# Patient Record
Sex: FEMALE | Race: WHITE | Employment: PART TIME | URBAN - METROPOLITAN AREA
[De-identification: names, ages, dates, MRNs, and addresses within clinical notes are randomized per-mention and may not be internally consistent; named-entity substitution may affect disease eponyms.]

---

## 2021-04-16 ENCOUNTER — HOSPITAL ENCOUNTER (OUTPATIENT)
Dept: WOMENS IMAGING | Age: 51
Discharge: HOME | End: 2021-04-18
Payer: MEDICARE

## 2021-04-16 ENCOUNTER — HOSPITAL ENCOUNTER (OUTPATIENT)
Dept: ULTRASOUND IMAGING | Age: 51
Discharge: HOME | End: 2021-04-18
Payer: MEDICARE

## 2021-04-16 DIAGNOSIS — R92.8 ABNORMAL MAMMOGRAM: ICD-10-CM

## 2021-04-16 DIAGNOSIS — N63.20 LEFT BREAST MASS: ICD-10-CM

## 2021-04-16 PROCEDURE — 76642 ULTRASOUND BREAST LIMITED: CPT

## 2021-04-16 PROCEDURE — G0279 TOMOSYNTHESIS, MAMMO: HCPCS

## 2021-08-13 ENCOUNTER — HOSPITAL ENCOUNTER (EMERGENCY)
Age: 51
Discharge: HOME | End: 2021-08-13
Attending: EMERGENCY MEDICINE
Payer: MEDICARE

## 2021-08-13 ENCOUNTER — APPOINTMENT (OUTPATIENT)
Dept: CT IMAGING | Age: 51
End: 2021-08-13
Payer: MEDICARE

## 2021-08-13 VITALS
WEIGHT: 160 LBS | SYSTOLIC BLOOD PRESSURE: 119 MMHG | TEMPERATURE: 97.9 F | HEART RATE: 78 BPM | OXYGEN SATURATION: 98 % | DIASTOLIC BLOOD PRESSURE: 81 MMHG | RESPIRATION RATE: 16 BRPM | HEIGHT: 69 IN | BODY MASS INDEX: 23.7 KG/M2

## 2021-08-13 DIAGNOSIS — Z87.11 HX OF GASTRIC ULCER: ICD-10-CM

## 2021-08-13 DIAGNOSIS — R10.13 ABDOMINAL PAIN, EPIGASTRIC: Primary | ICD-10-CM

## 2021-08-13 DIAGNOSIS — N30.00 ACUTE CYSTITIS WITHOUT HEMATURIA: ICD-10-CM

## 2021-08-13 DIAGNOSIS — L30.9 DERMATITIS: ICD-10-CM

## 2021-08-13 LAB
ALBUMIN SERPL-MCNC: 3.5 G/DL (ref 3.5–4.6)
ALP BLD-CCNC: 93 U/L (ref 40–130)
ALT SERPL-CCNC: 17 U/L (ref 0–33)
ANION GAP SERPL CALCULATED.3IONS-SCNC: 9 MEQ/L (ref 9–15)
AST SERPL-CCNC: 26 U/L (ref 0–35)
BILIRUB SERPL-MCNC: <0.2 MG/DL (ref 0.2–0.7)
BILIRUBIN URINE: NEGATIVE
BLOOD, URINE: ABNORMAL
BUN BLDV-MCNC: 12 MG/DL (ref 6–20)
CALCIUM SERPL-MCNC: 8.5 MG/DL (ref 8.5–9.9)
CHLORIDE BLD-SCNC: 103 MEQ/L (ref 95–107)
CLARITY: CLEAR
CO2: 28 MEQ/L (ref 20–31)
COLOR: YELLOW
CREAT SERPL-MCNC: 0.56 MG/DL (ref 0.5–0.9)
GFR AFRICAN AMERICAN: >60
GFR NON-AFRICAN AMERICAN: >60
GLOBULIN: 2.6 G/DL (ref 2.3–3.5)
GLUCOSE BLD-MCNC: 78 MG/DL (ref 70–99)
GLUCOSE URINE: NEGATIVE MG/DL
HCT VFR BLD CALC: 40.1 % (ref 37–47)
HEMOGLOBIN: 13.6 G/DL (ref 12–16)
KETONES, URINE: NEGATIVE MG/DL
LEUKOCYTE ESTERASE, URINE: ABNORMAL
MCH RBC QN AUTO: 32.4 PG (ref 27–31.3)
MCHC RBC AUTO-ENTMCNC: 33.9 % (ref 33–37)
MCV RBC AUTO: 95.6 FL (ref 82–100)
NITRITE, URINE: NEGATIVE
PDW BLD-RTO: 14.4 % (ref 11.5–14.5)
PH UA: 6 (ref 5–9)
PLATELET # BLD: 433 K/UL (ref 130–400)
POTASSIUM SERPL-SCNC: 3.2 MEQ/L (ref 3.4–4.9)
PROTEIN UA: NEGATIVE MG/DL
RBC # BLD: 4.19 M/UL (ref 4.2–5.4)
RBC UA: ABNORMAL /HPF (ref 0–2)
SODIUM BLD-SCNC: 140 MEQ/L (ref 135–144)
SPECIFIC GRAVITY UA: 1.01 (ref 1–1.03)
TOTAL PROTEIN: 6.1 G/DL (ref 6.3–8)
URINE REFLEX TO CULTURE: YES
UROBILINOGEN, URINE: 1 E.U./DL
WBC # BLD: 7.5 K/UL (ref 4.8–10.8)
WBC UA: ABNORMAL /HPF (ref 0–5)

## 2021-08-13 PROCEDURE — 96375 TX/PRO/DX INJ NEW DRUG ADDON: CPT

## 2021-08-13 PROCEDURE — 99284 EMERGENCY DEPT VISIT MOD MDM: CPT

## 2021-08-13 PROCEDURE — 6360000002 HC RX W HCPCS: Performed by: NURSE PRACTITIONER

## 2021-08-13 PROCEDURE — 80053 COMPREHEN METABOLIC PANEL: CPT

## 2021-08-13 PROCEDURE — 87186 SC STD MICRODIL/AGAR DIL: CPT

## 2021-08-13 PROCEDURE — 2580000003 HC RX 258: Performed by: NURSE PRACTITIONER

## 2021-08-13 PROCEDURE — 74177 CT ABD & PELVIS W/CONTRAST: CPT

## 2021-08-13 PROCEDURE — 87086 URINE CULTURE/COLONY COUNT: CPT

## 2021-08-13 PROCEDURE — 36415 COLL VENOUS BLD VENIPUNCTURE: CPT

## 2021-08-13 PROCEDURE — 96374 THER/PROPH/DIAG INJ IV PUSH: CPT

## 2021-08-13 PROCEDURE — 87077 CULTURE AEROBIC IDENTIFY: CPT

## 2021-08-13 PROCEDURE — 85027 COMPLETE CBC AUTOMATED: CPT

## 2021-08-13 PROCEDURE — 6360000004 HC RX CONTRAST MEDICATION: Performed by: EMERGENCY MEDICINE

## 2021-08-13 PROCEDURE — 81001 URINALYSIS AUTO W/SCOPE: CPT

## 2021-08-13 RX ORDER — CLOTRIMAZOLE AND BETAMETHASONE DIPROPIONATE 10; .64 MG/G; MG/G
CREAM TOPICAL
Qty: 45 G | Refills: 1 | Status: SHIPPED | OUTPATIENT
Start: 2021-08-13

## 2021-08-13 RX ORDER — NITROFURANTOIN 25; 75 MG/1; MG/1
100 CAPSULE ORAL 2 TIMES DAILY
Qty: 14 CAPSULE | Refills: 0 | Status: SHIPPED | OUTPATIENT
Start: 2021-08-13 | End: 2021-08-20

## 2021-08-13 RX ORDER — ONDANSETRON 2 MG/ML
4 INJECTION INTRAMUSCULAR; INTRAVENOUS ONCE
Status: COMPLETED | OUTPATIENT
Start: 2021-08-13 | End: 2021-08-13

## 2021-08-13 RX ORDER — MORPHINE SULFATE 4 MG/ML
4 INJECTION, SOLUTION INTRAMUSCULAR; INTRAVENOUS
Status: DISCONTINUED | OUTPATIENT
Start: 2021-08-13 | End: 2021-08-13 | Stop reason: HOSPADM

## 2021-08-13 RX ORDER — 0.9 % SODIUM CHLORIDE 0.9 %
1000 INTRAVENOUS SOLUTION INTRAVENOUS ONCE
Status: COMPLETED | OUTPATIENT
Start: 2021-08-13 | End: 2021-08-13

## 2021-08-13 RX ADMIN — IOPAMIDOL 100 ML: 755 INJECTION, SOLUTION INTRAVENOUS at 19:20

## 2021-08-13 RX ADMIN — ONDANSETRON 4 MG: 2 INJECTION INTRAMUSCULAR; INTRAVENOUS at 18:51

## 2021-08-13 RX ADMIN — MORPHINE SULFATE 4 MG: 4 INJECTION, SOLUTION INTRAMUSCULAR; INTRAVENOUS at 18:51

## 2021-08-13 RX ADMIN — SODIUM CHLORIDE 1000 ML: 9 INJECTION, SOLUTION INTRAVENOUS at 18:51

## 2021-08-13 ASSESSMENT — ENCOUNTER SYMPTOMS
COUGH: 0
NAUSEA: 0
CONSTIPATION: 0
RHINORRHEA: 0
BACK PAIN: 0
VOMITING: 0
ABDOMINAL DISTENTION: 0
SINUS PRESSURE: 0
TROUBLE SWALLOWING: 0
SORE THROAT: 0
WHEEZING: 0
ABDOMINAL PAIN: 1
DIARRHEA: 0
CHEST TIGHTNESS: 0
SINUS PAIN: 0
COLOR CHANGE: 0
SHORTNESS OF BREATH: 0

## 2021-08-13 ASSESSMENT — PAIN DESCRIPTION - LOCATION: LOCATION: ABDOMEN;FLANK

## 2021-08-13 ASSESSMENT — PAIN SCALES - GENERAL
PAINLEVEL_OUTOF10: 7
PAINLEVEL_OUTOF10: 7

## 2021-08-13 NOTE — ED TRIAGE NOTES
Pt arrived with complaint of abdominal and flank pain. Pt reports the pain is located generalized abdominal and flank area. Pt reports the pain is intermittent. Pt reports the pain started yesterday. Pt reports her urine has an \"odor\" and her legs started to swell. Pt denies shortness of breath, weakness, fatigue, fever, cold sweats, dizziness, vomiting or nausea. Pt is A & O x 4.

## 2021-08-14 NOTE — ED PROVIDER NOTES
[facility] ED  EMERGENCY DEPARTMENT ENCOUNTER      Pt Name: Pastor Vallejo  MRN: [id number]  [place] 1970  Date of evaluation: 8/13/2021  Provider: Lety Martinez       Chief Complaint   Patient presents with    Abdominal Pain    Flank Pain         HISTORY OF PRESENT ILLNESS   (Location/Symptom, Timing/Onset,Context/Setting, Quality, Duration, Modifying Factors, Severity)  Note limiting factors. Pastor Vallejo is a 54 y.o. female who presents to the emergency department for complaint of upper abdominal pain and discomfort for the past 2 days. Patient states pain is up to a 7 out of 10. She states he does have pain medication at home and tried taking these but it did not resolve the pain. She states the pain did spontaneously decrease but has returned again today with a generalized abdominal discomfort and tenderness. She states that she is eating and drinking well denies any nausea vomiting diarrhea. States she has not had a bowel movement the past 2 days usually regular but denies constipation sensation at this time. She denies any fevers chills sweats or generalized illness symptoms. She does have a history of multiple surgeries of the abdomen including gastric bypass followed by ulcer repair and sepsis with peritonitis. She states that her abdomen is not distended and is soft has a generalized tenderness that the entire to the abdomen. She denies any chest pain shortness breath difficulty breathing. She additionally states that she has some mild swelling of her right ankle as well as a rash that is developed in her right lower leg uncertain of any contact with any allergens or irritants denies any injury to her leg. Nursing Notes were reviewed. REVIEW OF SYSTEMS    (2-9 systems for level 4, 10 or more for level 5)     Review of Systems   Constitutional: Negative for activity change, appetite change, chills, diaphoresis, fatigue and fever. HENT: Negative for congestion, ear pain, postnasal drip, rhinorrhea, sinus pressure, sinus pain, sore throat and trouble swallowing. Eyes: Negative for visual disturbance. Respiratory: Negative for cough, chest tightness, shortness of breath and wheezing. Cardiovascular: Negative for chest pain and palpitations. Gastrointestinal: Positive for abdominal pain. Negative for abdominal distention, constipation, diarrhea, nausea and vomiting. Genitourinary: Negative for difficulty urinating, dysuria, flank pain, frequency and urgency. Musculoskeletal: Negative for arthralgias, back pain, myalgias, neck pain and neck stiffness. Skin: Positive for rash. Negative for color change. Neurological: Negative for dizziness, tremors, seizures, syncope, speech difficulty, weakness, light-headedness, numbness and headaches. Except as noted above the remainder of the review of systems was reviewed and negative.        PAST MEDICAL HISTORY     Past Medical History:   Diagnosis Date    Fibromyalgia      Past Surgical History:   Procedure Laterality Date    GASTRIC BYPASS SURGERY      2008     Social History     Socioeconomic History    Marital status: Single     Spouse name: None    Number of children: None    Years of education: None    Highest education level: None   Occupational History    None   Tobacco Use    Smoking status: Current Every Day Smoker     Packs/day: 1.00     Years: 25.00     Pack years: 25.00    Smokeless tobacco: Never Used   Substance and Sexual Activity    Alcohol use: Yes     Comment: 1-2 drinks    Drug use: Never    Sexual activity: None   Other Topics Concern    None   Social History Narrative    None     Social Determinants of Health     Financial Resource Strain:     Difficulty of Paying Living Expenses:    Food Insecurity:     Worried About Running Out of Food in the Last Year:     Ran Out of Food in the Last Year:    Transportation Needs:     Lack of Transportation Abdominal:      General: Bowel sounds are normal. There is no distension. Palpations: Abdomen is soft. There is no mass. Tenderness: There is generalized abdominal tenderness. There is no guarding or rebound. Hernia: No hernia is present. Musculoskeletal:         General: No tenderness or signs of injury. Normal range of motion. Cervical back: Normal range of motion and neck supple. No rigidity or tenderness. Right knee: Normal.      Right lower leg: Swelling present. No tenderness. Right ankle: Swelling present. No deformity, ecchymosis or lacerations. No tenderness. Normal range of motion. Legs:    Skin:     General: Skin is warm and dry. Capillary Refill: Capillary refill takes less than 2 seconds. Findings: Rash present. Neurological:      General: No focal deficit present. Mental Status: She is alert and oriented to person, place, and time. Mental status is at baseline. Cranial Nerves: No cranial nerve deficit. Sensory: No sensory deficit. Motor: No weakness. Coordination: Coordination normal.         RESULTS     EKG: All EKG's are interpreted by the Emergency Department Physician who either signs or Co-signsthis chart in the absence of a cardiologist.        RADIOLOGY:   [place] such as CT, Ultrasound and MRI are read by the radiologist. Dmitriy Ungerman radiographic images are visualized and preliminarily interpreted by the emergency physician with the below findings:    IMPRESSION:     Mild wall thickening of the stomach may be due to underdistention or gastritis.     Small hiatal hernia.     Interpretation per the Radiologist below, if available at the time ofthis note:    CT ABDOMEN PELVIS W IV CONTRAST Additional Contrast? None   Final Result            ED BEDSIDE ULTRASOUND:   Performed by ED Physician - none    LABS:  Labs Reviewed   URINE RT REFLEX TO CULTURE - Abnormal; Notable for the following components:       Result feels more comfortable and stable for discharge. States she has at work in the morning and she is feeling better at this time. There is a small rash on the right lower extremity with nonpitting swelling of the ankle without signs of injury. Patient will be provided with medication for dermatitis antifungal medication. Unknown etiology at this time. There are no other apparent trauma to the lower leg or ankle. Try to follow primary care provider, states that she is from [place] and has a GI specialist primary care provider there but she is moving to this area I will provide her with a GI specialist follow-up contact formation. Return to the ER if any onset of new concerns and worsening condition. Patient verbalized understandable given instruction education. CRITICAL CARE TIME       CONSULTS:  None    PROCEDURES:  Unless otherwise noted below, none     Procedures    FINAL IMPRESSION      1. Abdominal pain, epigastric    2. Hx of gastric ulcer    3. Acute cystitis without hematuria    4. Dermatitis          DISPOSITION/PLAN   DISPOSITION Decision To Discharge 08/13/2021 08:38:48 PM      PATIENT REFERRED TO:  Anshul Beckett MD  [address] [place] [id number] [phone]    Call in 1 day      Kalpesh Garcia  [address] [place] [address] [phone]            DISCHARGE MEDICATIONS:  Discharge Medication List as of 8/13/2021  8:57 PM      START taking these medications    Details   nitrofurantoin, macrocrystal-monohydrate, (MACROBID) 100 MG capsule Take 1 capsule by mouth 2 times daily for 7 days, Disp-14 capsule, R-0Print      clotrimazole-betamethasone (LOTRISONE) 1-0.05 % cream Apply topically 2 times daily. , Disp-45 g, R-1, Print                (Please notethat portions of this note were completed with a voice recognition program.  Efforts were made to edit the dictations but occasionally words are mis-transcribed.)    NAVIN Rdz - CNP (electronically signed)  Attending Emergency Physician         Anita Dean, NAVIN - MICHELLE  08/13/21 1054

## 2021-08-15 LAB
GFR AFRICAN AMERICAN: >60
GFR NON-AFRICAN AMERICAN: >60
PERFORMED ON: NORMAL
POC CREATININE: 0.6 MG/DL (ref 0.6–1.1)
POC SAMPLE TYPE: NORMAL

## 2021-08-16 LAB
ORGANISM: ABNORMAL
URINE CULTURE, ROUTINE: ABNORMAL

## 2022-07-30 ENCOUNTER — HOSPITAL ENCOUNTER (EMERGENCY)
Age: 52
Discharge: HOME | End: 2022-07-30
Payer: COMMERCIAL

## 2022-07-30 VITALS
BODY MASS INDEX: 21.98 KG/M2 | RESPIRATION RATE: 17 BRPM | TEMPERATURE: 99.3 F | WEIGHT: 145 LBS | HEART RATE: 76 BPM | HEIGHT: 68 IN | DIASTOLIC BLOOD PRESSURE: 76 MMHG | OXYGEN SATURATION: 96 % | SYSTOLIC BLOOD PRESSURE: 121 MMHG

## 2022-07-30 DIAGNOSIS — S61.216A LACERATION OF RIGHT LITTLE FINGER WITHOUT FOREIGN BODY WITHOUT DAMAGE TO NAIL, INITIAL ENCOUNTER: Primary | ICD-10-CM

## 2022-07-30 PROCEDURE — 90471 IMMUNIZATION ADMIN: CPT | Performed by: PHYSICIAN ASSISTANT

## 2022-07-30 PROCEDURE — 6370000000 HC RX 637 (ALT 250 FOR IP): Performed by: PHYSICIAN ASSISTANT

## 2022-07-30 PROCEDURE — 6360000002 HC RX W HCPCS: Performed by: PHYSICIAN ASSISTANT

## 2022-07-30 PROCEDURE — 90715 TDAP VACCINE 7 YRS/> IM: CPT | Performed by: PHYSICIAN ASSISTANT

## 2022-07-30 PROCEDURE — 99284 EMERGENCY DEPT VISIT MOD MDM: CPT

## 2022-07-30 RX ORDER — OXYCODONE HYDROCHLORIDE AND ACETAMINOPHEN 5; 325 MG/1; MG/1
1 TABLET ORAL EVERY 8 HOURS PRN
Qty: 6 TABLET | Refills: 0 | Status: SHIPPED | OUTPATIENT
Start: 2022-07-30 | End: 2022-08-01

## 2022-07-30 RX ORDER — OXYCODONE HYDROCHLORIDE AND ACETAMINOPHEN 5; 325 MG/1; MG/1
1 TABLET ORAL EVERY 8 HOURS PRN
Qty: 6 TABLET | Refills: 0 | Status: SHIPPED | OUTPATIENT
Start: 2022-07-30 | End: 2022-07-30

## 2022-07-30 RX ORDER — OXYCODONE HYDROCHLORIDE AND ACETAMINOPHEN 5; 325 MG/1; MG/1
1 TABLET ORAL ONCE
Status: COMPLETED | OUTPATIENT
Start: 2022-07-30 | End: 2022-07-30

## 2022-07-30 RX ORDER — SULFAMETHOXAZOLE AND TRIMETHOPRIM 800; 160 MG/1; MG/1
1 TABLET ORAL ONCE
Status: COMPLETED | OUTPATIENT
Start: 2022-07-30 | End: 2022-07-30

## 2022-07-30 RX ADMIN — SULFAMETHOXAZOLE AND TRIMETHOPRIM 1 TABLET: 800; 160 TABLET ORAL at 19:39

## 2022-07-30 RX ADMIN — TETANUS TOXOID, REDUCED DIPHTHERIA TOXOID AND ACELLULAR PERTUSSIS VACCINE, ADSORBED 0.5 ML: 5; 2.5; 8; 8; 2.5 SUSPENSION INTRAMUSCULAR at 19:39

## 2022-07-30 RX ADMIN — OXYCODONE AND ACETAMINOPHEN 1 TABLET: 5; 325 TABLET ORAL at 19:39

## 2022-07-30 ASSESSMENT — ENCOUNTER SYMPTOMS
ALLERGIC/IMMUNOLOGIC NEGATIVE: 1
TROUBLE SWALLOWING: 0
APNEA: 0
SHORTNESS OF BREATH: 0
EYE PAIN: 0
ABDOMINAL PAIN: 0
COLOR CHANGE: 0

## 2022-07-30 ASSESSMENT — PAIN DESCRIPTION - LOCATION
LOCATION: HAND
LOCATION: FINGER (COMMENT WHICH ONE)

## 2022-07-30 ASSESSMENT — PAIN DESCRIPTION - DESCRIPTORS: DESCRIPTORS: ACHING

## 2022-07-30 ASSESSMENT — LIFESTYLE VARIABLES
HOW OFTEN DO YOU HAVE A DRINK CONTAINING ALCOHOL: NEVER
HOW MANY STANDARD DRINKS CONTAINING ALCOHOL DO YOU HAVE ON A TYPICAL DAY: PATIENT DOES NOT DRINK

## 2022-07-30 ASSESSMENT — PAIN DESCRIPTION - ORIENTATION: ORIENTATION: RIGHT

## 2022-07-30 ASSESSMENT — PAIN DESCRIPTION - ONSET: ONSET: SUDDEN

## 2022-07-30 ASSESSMENT — PAIN DESCRIPTION - FREQUENCY: FREQUENCY: CONTINUOUS

## 2022-07-30 ASSESSMENT — PAIN - FUNCTIONAL ASSESSMENT
PAIN_FUNCTIONAL_ASSESSMENT: ACTIVITIES ARE NOT PREVENTED
PAIN_FUNCTIONAL_ASSESSMENT: 0-10

## 2022-07-30 ASSESSMENT — PAIN SCALES - GENERAL: PAINLEVEL_OUTOF10: 10

## 2022-07-30 ASSESSMENT — PAIN DESCRIPTION - PAIN TYPE: TYPE: ACUTE PAIN

## 2022-07-30 NOTE — ED TRIAGE NOTES
Pt was at home cooking when she accidentally cut the tip of her #5 finger on her right hand. Pt states there is a lot of bleeding but she has it well wrapped. Pt states she believes the cut is just before the nail and not into it.

## 2022-07-31 NOTE — ED PROVIDER NOTES
[facility] ED  eMERGENCYdEPARTMENT eNCOUnter      Pt Name: Gloria Coffey  MRN: [id number]  [place] 1970  Date of evaluation: 7/30/2022  Provider:Bijan Braswell PA-C    CHIEF COMPLAINT       Chief Complaint   Patient presents with    Laceration         HISTORY OF PRESENT ILLNESS  (Location/Symptom, Timing/Onset, Context/Setting, Quality, Duration, Modifying Factors, Severity.)   Gloria Coffey is a 50 y.o. female who presents to the emergency department with an avulsion laceration to the tip of the right fifth finger. Patient states that she was using a mandolin when the injury occurred. This happened 2 hours prior to arrival.  Patient states that she is not able to get the bleeding to stop so that is why she came in. Last tetanus shot approximately 8 years ago. HPI    Nursing Notes were reviewed and I agree. REVIEW OF SYSTEMS    (2-9 systems for level 4, 10 or more for level 5)     Review of Systems   Constitutional:  Negative for diaphoresis and fever. HENT:  Negative for hearing loss and trouble swallowing. Eyes:  Negative for pain. Respiratory:  Negative for apnea and shortness of breath. Cardiovascular:  Negative for chest pain. Gastrointestinal:  Negative for abdominal pain. Endocrine: Negative. Genitourinary:  Negative for hematuria. Musculoskeletal:  Negative for neck pain and neck stiffness. Skin:  Positive for wound. Negative for color change. Allergic/Immunologic: Negative. Neurological:  Negative for dizziness and numbness. Hematological: Negative. Psychiatric/Behavioral: Negative. All other systems reviewed and are negative. Except as noted above the remainder of the review of systems was reviewed and negative.        PAST MEDICAL HISTORY     Past Medical History:   Diagnosis Date    Fibromyalgia          SURGICAL HISTORY       Past Surgical History:   Procedure Laterality Date    GASTRIC BYPASS SURGERY      2008         CURRENT MEDICATIONS Previous Medications    CLOTRIMAZOLE-BETAMETHASONE (LOTRISONE) 1-0.05 % CREAM    Apply topically 2 times daily. ALLERGIES     Pcn [penicillins]    FAMILY HISTORY     History reviewed. No pertinent family history. SOCIAL HISTORY       Social History     Socioeconomic History    Marital status: Single     Spouse name: None    Number of children: None    Years of education: None    Highest education level: None   Tobacco Use    Smoking status: Every Day     Packs/day: 1.00     Years: 25.00     Pack years: 25.00     Types: Cigarettes    Smokeless tobacco: Never   Substance and Sexual Activity    Alcohol use: Yes     Comment: 1-2 drinks    Drug use: Never       SCREENINGS    Marisa Coma Scale  Eye Opening: Spontaneous  Best Verbal Response: Oriented  Best Motor Response: Obeys commands  [place] Coma Scale Score: 15      PHYSICAL EXAM    (up to 7 forlevel 4, 8 or more for level 5)     ED Triage Vitals [07/30/22 1908]   BP Temp Temp Source Heart Rate Resp SpO2 Height Weight   121/76 99.3 °F (37.4 °C) Oral 76 17 96 % 5' 8\" (1.727 m) 145 lb (65.8 kg)       Physical Exam  Vitals and nursing note reviewed. Constitutional:       General: She is not in acute distress. Appearance: She is well-developed. HENT:      Head: Normocephalic and atraumatic. Nose: Nose normal.      Mouth/Throat:      Mouth: Mucous membranes are moist.   Eyes:      General: No scleral icterus. Pupils: Pupils are equal, round, and reactive to light. Neck:      Trachea: No tracheal deviation. Cardiovascular:      Rate and Rhythm: Normal rate and regular rhythm. Heart sounds: Normal heart sounds. No murmur heard. Pulmonary:      Effort: Pulmonary effort is normal. No respiratory distress. Breath sounds: Normal breath sounds. No wheezing or rales. Abdominal:      General: Bowel sounds are normal. There is no distension. Palpations: Abdomen is soft. Tenderness: There is no abdominal tenderness.  There is no guarding. Musculoskeletal:         General: Normal range of motion. Right hand: Laceration present. Hands:       Cervical back: Normal range of motion and neck supple. Skin:     General: Skin is warm and dry. Findings: No erythema or rash. Neurological:      Mental Status: She is alert and oriented to person, place, and time. Deep Tendon Reflexes: Reflexes are normal and symmetric. Psychiatric:         Behavior: Behavior normal.         Thought Content: Thought content normal.         Judgment: Judgment normal.         DIAGNOSTIC RESULTS     RADIOLOGY:   Non-plain film images such as CT, Ultrasound and MRI are read by the radiologist. Plain radiographic images are visualized and preliminarilyinterpreted by Akira Cochran PA-C with the below findings:        Interpretation per the Radiologist below, if available at the time of this note:    No orders to display       LABS:  Labs Reviewed - No data to display    All other labs were within normal range or not returnedas of this dictation. EMERGENCYDEPARTMENT COURSE and DIFFERENTIAL DIAGNOSIS/MDM:   Vitals:    Vitals:    07/30/22 1908   BP: 121/76   Pulse: 76   Resp: 17   Temp: 99.3 °F (37.4 °C)   TempSrc: Oral   SpO2: 96%   Weight: 145 lb (65.8 kg)   Height: 5' 8\" (1.727 m)       REASSESSMENT        Patient presented with a fingertip avulsion laceration. This was closed with skin glue with good result. Splint for protection. Patient will be discharged with pain control.   Patient is already on oral antibiotics and I will advise her to continue taking these antibiotics    MDM      PROCEDURES:    Lac Repair    Date/Time: 7/30/2022 8:03 PM  Performed by: Akira Cochran PA-C  Authorized by: Akira Cochran PA-C     Consent:     Consent obtained:  Verbal    Consent given by:  Patient    Risks, benefits, and alternatives were discussed: yes      Risks discussed:  Infection, pain, poor cosmetic result and need for additional repair  Universal protocol:     Procedure explained and questions answered to patient or proxy's satisfaction: yes      Relevant documents present and verified: yes      Immediately prior to procedure, a time out was called: yes      Patient identity confirmed:  Verbally with patient and arm band  Anesthesia:     Anesthesia method:  None  Laceration details:     Location:  Finger    Finger location:  R small finger    Length (cm):  0.5    Depth (mm):  1  Pre-procedure details:     Preparation:  Patient was prepped and draped in usual sterile fashion  Exploration:     Limited defect created (wound extended): no      Hemostasis achieved with:  Tourniquet    Wound exploration: wound explored through full range of motion      Contaminated: no    Treatment:     Area cleansed with:  Chlorhexidine    Amount of cleaning:  Standard    Irrigation solution:  Sterile saline  Skin repair:     Repair method:  Tissue adhesive  Repair type:     Repair type:  Simple  Post-procedure details:     Dressing:  Splint for protection    Procedure completion:  Tolerated well, no immediate complications      FINAL IMPRESSION      1. Laceration of right little finger without foreign body without damage to nail, initial encounter          DISPOSITION/PLAN   DISPOSITION Decision To Discharge 07/30/2022 08:04:59 PM      PATIENT REFERRED TO:  Heather German MD  [address] [place] [id number] [phone]    Call in 2 days      DISCHARGE MEDICATIONS:  New Prescriptions    OXYCODONE-ACETAMINOPHEN (PERCOCET) 5-325 MG PER TABLET    Take 1 tablet by mouth every 8 hours as needed for Pain for up to 2 days. Intended supply: 2 days.  Take lowest dose possible to manage pain       (Please note that portions of this note were completed with a voice recognition program.  Efforts were made to edit the dictations but occasionally words are mis-transcribed.)    CATHY Scott PA-C  07/30/22 2006

## 2023-01-13 ENCOUNTER — HOSPITAL ENCOUNTER (OUTPATIENT)
Dept: CT IMAGING | Age: 53
End: 2023-01-13
Payer: COMMERCIAL

## 2023-01-13 DIAGNOSIS — S00.93XA CONTUSION OF UNSPECIFIED PART OF HEAD, INITIAL ENCOUNTER: ICD-10-CM

## 2023-01-13 PROCEDURE — 70450 CT HEAD/BRAIN W/O DYE: CPT

## 2023-02-12 ENCOUNTER — HOSPITAL ENCOUNTER (INPATIENT)
Age: 53
LOS: 1 days | DRG: 308 | End: 2023-02-13
Attending: EMERGENCY MEDICINE | Admitting: INTERNAL MEDICINE
Payer: COMMERCIAL

## 2023-02-12 ENCOUNTER — APPOINTMENT (OUTPATIENT)
Dept: GENERAL RADIOLOGY | Age: 53
DRG: 308 | End: 2023-02-12
Payer: COMMERCIAL

## 2023-02-12 ENCOUNTER — APPOINTMENT (OUTPATIENT)
Dept: CT IMAGING | Age: 53
DRG: 308 | End: 2023-02-12
Payer: COMMERCIAL

## 2023-02-12 DIAGNOSIS — I46.9 CARDIOPULMONARY ARREST (HCC): Primary | ICD-10-CM

## 2023-02-12 DIAGNOSIS — J69.0 ASPIRATION PNEUMONIA OF LEFT LOWER LOBE DUE TO GASTRIC SECRETIONS (HCC): ICD-10-CM

## 2023-02-12 DIAGNOSIS — I47.20 VENTRICULAR TACHYCARDIA: ICD-10-CM

## 2023-02-12 DIAGNOSIS — J18.9 PNEUMONIA OF LEFT LOWER LOBE DUE TO INFECTIOUS ORGANISM: ICD-10-CM

## 2023-02-12 LAB
ABO/RH: NORMAL
ALBUMIN SERPL-MCNC: 2.4 G/DL (ref 3.5–4.6)
ALP BLD-CCNC: 90 U/L (ref 40–130)
ALT SERPL-CCNC: 40 U/L (ref 0–33)
AMPHETAMINE SCREEN, URINE: NORMAL
ANION GAP SERPL CALCULATED.3IONS-SCNC: 11 MEQ/L (ref 9–15)
ANION GAP SERPL CALCULATED.3IONS-SCNC: 15 MEQ/L (ref 9–15)
ANION GAP SERPL CALCULATED.3IONS-SCNC: 18 MEQ/L (ref 9–15)
ANION GAP SERPL CALCULATED.3IONS-SCNC: 18 MEQ/L (ref 9–15)
ANION GAP SERPL CALCULATED.3IONS-SCNC: 9 MEQ/L (ref 9–15)
ANTIBODY SCREEN: NORMAL
APTT: 37.1 SEC (ref 24.4–36.8)
APTT: 46.5 SEC (ref 24.4–36.8)
AST SERPL-CCNC: 126 U/L (ref 0–35)
ATYPICAL LYMPHOCYTE RELATIVE PERCENT: 1 %
BANDED NEUTROPHILS RELATIVE PERCENT: 6 % (ref 5–11)
BARBITURATE SCREEN URINE: NORMAL
BASE EXCESS ARTERIAL: -3 (ref -3–3)
BASE EXCESS ARTERIAL: -5 (ref -3–3)
BASE EXCESS ARTERIAL: -6 (ref -3–3)
BASE EXCESS ARTERIAL: -7 (ref -3–3)
BASE EXCESS ARTERIAL: -9 (ref -3–3)
BASE EXCESS VENOUS: -25 (ref -3–3)
BASOPHILS ABSOLUTE: 0.2 K/UL (ref 0–0.2)
BASOPHILS RELATIVE PERCENT: 1 %
BENZODIAZEPINE SCREEN, URINE: NORMAL
BILIRUB SERPL-MCNC: <0.2 MG/DL (ref 0.2–0.7)
BUN BLDV-MCNC: 12 MG/DL (ref 6–20)
BUN BLDV-MCNC: 19 MG/DL (ref 6–20)
BUN BLDV-MCNC: 24 MG/DL (ref 6–20)
BUN BLDV-MCNC: 26 MG/DL (ref 6–20)
BUN BLDV-MCNC: 28 MG/DL (ref 6–20)
CALCIUM IONIZED: 1.03 MMOL/L (ref 1.12–1.32)
CALCIUM IONIZED: 1.18 MMOL/L (ref 1.12–1.32)
CALCIUM IONIZED: 1.2 MMOL/L (ref 1.12–1.32)
CALCIUM IONIZED: 1.22 MMOL/L (ref 1.12–1.32)
CALCIUM IONIZED: 1.24 MMOL/L (ref 1.12–1.32)
CALCIUM IONIZED: 1.26 MMOL/L (ref 1.12–1.32)
CALCIUM IONIZED: 1.35 MMOL/L (ref 1.12–1.32)
CALCIUM SERPL-MCNC: 12 MG/DL (ref 8.5–9.9)
CALCIUM SERPL-MCNC: 6.7 MG/DL (ref 8.5–9.9)
CALCIUM SERPL-MCNC: 7 MG/DL (ref 8.5–9.9)
CALCIUM SERPL-MCNC: 9.2 MG/DL (ref 8.5–9.9)
CALCIUM SERPL-MCNC: 9.4 MG/DL (ref 8.5–9.9)
CANNABINOID SCREEN URINE: NORMAL
CHLORIDE BLD-SCNC: 112 MEQ/L (ref 95–107)
CHLORIDE BLD-SCNC: 114 MEQ/L (ref 95–107)
CHLORIDE BLD-SCNC: 114 MEQ/L (ref 95–107)
CHLORIDE BLD-SCNC: 115 MEQ/L (ref 95–107)
CHLORIDE BLD-SCNC: 117 MEQ/L (ref 95–107)
CHP ED QC CHECK: NORMAL
CO2: 20 MEQ/L (ref 20–31)
CO2: 23 MEQ/L (ref 20–31)
CO2: 23 MEQ/L (ref 20–31)
CO2: 28 MEQ/L (ref 20–31)
CO2: 29 MEQ/L (ref 20–31)
COCAINE METABOLITE SCREEN URINE: NORMAL
CREAT SERPL-MCNC: 0.8 MG/DL (ref 0.5–0.9)
CREAT SERPL-MCNC: 1.05 MG/DL (ref 0.5–0.9)
CREAT SERPL-MCNC: 1.27 MG/DL (ref 0.5–0.9)
CREAT SERPL-MCNC: 1.31 MG/DL (ref 0.5–0.9)
CREAT SERPL-MCNC: 1.35 MG/DL (ref 0.5–0.9)
EOSINOPHILS ABSOLUTE: 0.2 K/UL (ref 0–0.7)
EOSINOPHILS RELATIVE PERCENT: 1 %
ETHANOL PERCENT: NORMAL G/DL
ETHANOL: <10 MG/DL (ref 0–0.08)
FENTANYL SCREEN, URINE: NORMAL
GFR SERPL CREATININE-BSD FRML MDRD: 39 ML/MIN/{1.73_M2}
GFR SERPL CREATININE-BSD FRML MDRD: 42 ML/MIN/{1.73_M2}
GFR SERPL CREATININE-BSD FRML MDRD: 46 ML/MIN/{1.73_M2}
GFR SERPL CREATININE-BSD FRML MDRD: 47.6 ML/MIN/{1.73_M2}
GFR SERPL CREATININE-BSD FRML MDRD: 49.4 ML/MIN/{1.73_M2}
GFR SERPL CREATININE-BSD FRML MDRD: 50 ML/MIN/{1.73_M2}
GFR SERPL CREATININE-BSD FRML MDRD: 51.2 ML/MIN/{1.73_M2}
GFR SERPL CREATININE-BSD FRML MDRD: 55 ML/MIN/{1.73_M2}
GFR SERPL CREATININE-BSD FRML MDRD: >60 ML/MIN/{1.73_M2}
GLOBULIN: 1.5 G/DL (ref 2.3–3.5)
GLUCOSE BLD-MCNC: 110 MG/DL (ref 70–99)
GLUCOSE BLD-MCNC: 111 MG/DL (ref 70–99)
GLUCOSE BLD-MCNC: 118 MG/DL (ref 70–99)
GLUCOSE BLD-MCNC: 147 MG/DL (ref 70–99)
GLUCOSE BLD-MCNC: 169 MG/DL (ref 70–99)
GLUCOSE BLD-MCNC: 189 MG/DL (ref 70–99)
GLUCOSE BLD-MCNC: 297 MG/DL (ref 70–99)
GLUCOSE BLD-MCNC: 435 MG/DL
GLUCOSE BLD-MCNC: 435 MG/DL (ref 70–99)
GLUCOSE BLD-MCNC: 44 MG/DL (ref 70–99)
GLUCOSE BLD-MCNC: 63 MG/DL (ref 70–99)
GLUCOSE BLD-MCNC: 64 MG/DL (ref 70–99)
GLUCOSE BLD-MCNC: 66 MG/DL (ref 70–99)
GLUCOSE BLD-MCNC: 74 MG/DL (ref 70–99)
GLUCOSE BLD-MCNC: 83 MG/DL (ref 70–99)
HCO3 ARTERIAL: 21.2 MMOL/L (ref 21–29)
HCO3 ARTERIAL: 23.4 MMOL/L (ref 21–29)
HCO3 ARTERIAL: 24.7 MMOL/L (ref 21–29)
HCO3 ARTERIAL: 26.1 MMOL/L (ref 21–29)
HCO3 ARTERIAL: 27.1 MMOL/L (ref 21–29)
HCO3 VENOUS: 9.6 MMOL/L (ref 23–29)
HCT VFR BLD CALC: 41.6 % (ref 37–47)
HEMOGLOBIN: 13.2 G/DL (ref 12–16)
HEMOGLOBIN: 15.8 GM/DL (ref 12–16)
HEMOGLOBIN: 17.4 GM/DL (ref 12–16)
HEMOGLOBIN: 17.6 GM/DL (ref 12–16)
HEMOGLOBIN: 18 GM/DL (ref 12–16)
HEMOGLOBIN: 18.3 GM/DL (ref 12–16)
HEMOGLOBIN: 18.8 GM/DL (ref 12–16)
HEMOGLOBIN: 19 GM/DL (ref 12–16)
INR BLD: 1.3
INR BLD: 1.6
LACTATE: 12.29 MMOL/L (ref 0.4–2)
LACTATE: 2.13 MMOL/L (ref 0.4–2)
LACTATE: 2.36 MMOL/L (ref 0.4–2)
LACTATE: 3.6 MMOL/L (ref 0.4–2)
LACTATE: 3.72 MMOL/L (ref 0.4–2)
LACTATE: 5.21 MMOL/L (ref 0.4–2)
LACTATE: 6.04 MMOL/L (ref 0.4–2)
LACTIC ACID: 3.4 MMOL/L (ref 0.5–2.2)
LACTIC ACID: 4.6 MMOL/L (ref 0.5–2.2)
LIPASE: 116 U/L (ref 12–95)
LYMPHOCYTES ABSOLUTE: 9.6 K/UL (ref 1–4.8)
LYMPHOCYTES RELATIVE PERCENT: 39 %
Lab: NORMAL
MAGNESIUM: 2.1 MG/DL (ref 1.7–2.4)
MAGNESIUM: 2.9 MG/DL (ref 1.7–2.4)
MCH RBC QN AUTO: 33.4 PG (ref 27–31.3)
MCHC RBC AUTO-ENTMCNC: 31.8 % (ref 33–37)
MCV RBC AUTO: 105 FL (ref 79.4–94.8)
METAMYELOCYTES RELATIVE PERCENT: 2 %
METHADONE SCREEN, URINE: NORMAL
MONOCYTES ABSOLUTE: 0.2 K/UL (ref 0.2–0.8)
MONOCYTES RELATIVE PERCENT: 0.9 %
MYELOCYTE PERCENT: 1 %
NEUTROPHILS ABSOLUTE: 13.9 K/UL (ref 1.4–6.5)
NEUTROPHILS RELATIVE PERCENT: 49 %
O2 SAT, ARTERIAL: 62 % (ref 93–100)
O2 SAT, ARTERIAL: 62 % (ref 93–100)
O2 SAT, ARTERIAL: 68 % (ref 93–100)
O2 SAT, ARTERIAL: 71 % (ref 93–100)
O2 SAT, ARTERIAL: 78 % (ref 93–100)
O2 SAT, VEN: 83 %
OPIATE SCREEN URINE: NORMAL
OXYCODONE URINE: NORMAL
PCO2 ARTERIAL: 104 MM HG (ref 35–45)
PCO2 ARTERIAL: 112 MM HG (ref 35–45)
PCO2 ARTERIAL: 75 MM HG (ref 35–45)
PCO2 ARTERIAL: 75 MM HG (ref 35–45)
PCO2 ARTERIAL: 94 MM HG (ref 35–45)
PCO2 ARTERIAL: >161 MM HG (ref 35–45)
PCO2, VEN: 58.5 MM HG (ref 40–50)
PDW BLD-RTO: 13.2 % (ref 11.5–14.5)
PERFORMED ON: ABNORMAL
PERFORMED ON: NORMAL
PH ARTERIAL: 6.71 (ref 7.35–7.45)
PH ARTERIAL: 6.95 (ref 7.35–7.45)
PH ARTERIAL: 7.01 (ref 7.35–7.45)
PH ARTERIAL: 7.06 (ref 7.35–7.45)
PH ARTERIAL: 7.07 (ref 7.35–7.45)
PH ARTERIAL: 7.11 (ref 7.35–7.45)
PH VENOUS: 6.82 (ref 7.32–7.42)
PHENCYCLIDINE SCREEN URINE: NORMAL
PHOSPHORUS: 11.1 MG/DL (ref 2.3–4.8)
PHOSPHORUS: 11.8 MG/DL (ref 2.3–4.8)
PHOSPHORUS: 11.8 MG/DL (ref 2.3–4.8)
PLATELET # BLD: 289 K/UL (ref 130–400)
PLATELET SLIDE REVIEW: NORMAL
PO2 ARTERIAL: 46 MM HG (ref 75–108)
PO2 ARTERIAL: 49 MM HG (ref 75–108)
PO2 ARTERIAL: 53 MM HG (ref 75–108)
PO2 ARTERIAL: 54 MM HG (ref 75–108)
PO2 ARTERIAL: 66 MM HG (ref 75–108)
PO2 ARTERIAL: 80 MM HG (ref 75–108)
PO2, VEN: 86 MM HG
POC CHLORIDE: 116 MEQ/L (ref 99–110)
POC CHLORIDE: 116 MEQ/L (ref 99–110)
POC CHLORIDE: 118 MEQ/L (ref 99–110)
POC CHLORIDE: 119 MEQ/L (ref 99–110)
POC CHLORIDE: 119 MEQ/L (ref 99–110)
POC CHLORIDE: 120 MEQ/L (ref 99–110)
POC CHLORIDE: 124 MEQ/L (ref 99–110)
POC CREATININE WHOLE BLOOD: 1.1
POC CREATININE: 1 MG/DL (ref 0.6–1.2)
POC CREATININE: 1.1 MG/DL (ref 0.6–1.2)
POC CREATININE: 1.2 MG/DL (ref 0.6–1.2)
POC CREATININE: 1.3 MG/DL (ref 0.6–1.2)
POC CREATININE: 1.4 MG/DL (ref 0.6–1.2)
POC CREATININE: 1.5 MG/DL (ref 0.6–1.2)
POC CREATININE: 1.6 MG/DL (ref 0.6–1.2)
POC FIO2: 100
POC HEMATOCRIT: 47 % (ref 36–48)
POC HEMATOCRIT: 51 % (ref 36–48)
POC HEMATOCRIT: 52 % (ref 36–48)
POC HEMATOCRIT: 53 % (ref 36–48)
POC HEMATOCRIT: 54 % (ref 36–48)
POC HEMATOCRIT: 55 % (ref 36–48)
POC HEMATOCRIT: 56 % (ref 36–48)
POC POTASSIUM: 3.7 MEQ/L (ref 3.5–5.1)
POC POTASSIUM: 3.8 MEQ/L (ref 3.5–5.1)
POC POTASSIUM: 4 MEQ/L (ref 3.5–5.1)
POC POTASSIUM: 4.2 MEQ/L (ref 3.5–5.1)
POC POTASSIUM: 4.3 MEQ/L (ref 3.5–5.1)
POC POTASSIUM: 5.7 MEQ/L (ref 3.5–5.1)
POC POTASSIUM: 5.8 MEQ/L (ref 3.5–5.1)
POC SAMPLE TYPE: ABNORMAL
POC SODIUM: 138 MEQ/L (ref 136–145)
POC SODIUM: 145 MEQ/L (ref 136–145)
POC SODIUM: 149 MEQ/L (ref 136–145)
POC SODIUM: 151 MEQ/L (ref 136–145)
POC SODIUM: 152 MEQ/L (ref 136–145)
POC SODIUM: 153 MEQ/L (ref 136–145)
POC SODIUM: 154 MEQ/L (ref 136–145)
POTASSIUM SERPL-SCNC: 3.5 MEQ/L (ref 3.4–4.9)
POTASSIUM SERPL-SCNC: 3.5 MEQ/L (ref 3.4–4.9)
POTASSIUM SERPL-SCNC: 3.9 MEQ/L (ref 3.4–4.9)
POTASSIUM SERPL-SCNC: 4 MEQ/L (ref 3.4–4.9)
POTASSIUM SERPL-SCNC: 5 MEQ/L (ref 3.4–4.9)
PRO-BNP: 1115 PG/ML
PROCALCITONIN: 0.58 NG/ML (ref 0–0.15)
PROPOXYPHENE SCREEN: NORMAL
PROTHROMBIN TIME: 16.7 SEC (ref 12.3–14.9)
PROTHROMBIN TIME: 19.2 SEC (ref 12.3–14.9)
RBC # BLD: 3.97 M/UL (ref 4.2–5.4)
RBC # BLD: NORMAL 10*6/UL
SODIUM BLD-SCNC: 149 MEQ/L (ref 135–144)
SODIUM BLD-SCNC: 150 MEQ/L (ref 135–144)
SODIUM BLD-SCNC: 155 MEQ/L (ref 135–144)
SODIUM BLD-SCNC: 155 MEQ/L (ref 135–144)
SODIUM BLD-SCNC: 157 MEQ/L (ref 135–144)
TCO2 ARTERIAL: 23 MMOL/L (ref 21–32)
TCO2 ARTERIAL: 26 MMOL/L (ref 21–32)
TCO2 ARTERIAL: 28 MMOL/L (ref 21–32)
TCO2 ARTERIAL: 29 MMOL/L (ref 21–32)
TCO2 ARTERIAL: 30 MMOL/L (ref 21–32)
TCO2 CALC VENOUS: 11 MMOL/L
TOTAL PROTEIN: 3.9 G/DL (ref 6.3–8)
TROPONIN: 0.08 NG/ML (ref 0–0.01)
TROPONIN: 1.16 NG/ML (ref 0–0.01)
TROPONIN: 1.17 NG/ML (ref 0–0.01)
TROPONIN: 1.19 NG/ML (ref 0–0.01)
WBC # BLD: 23.9 K/UL (ref 4.8–10.8)

## 2023-02-12 PROCEDURE — 36600 WITHDRAWAL OF ARTERIAL BLOOD: CPT

## 2023-02-12 PROCEDURE — 70450 CT HEAD/BRAIN W/O DYE: CPT

## 2023-02-12 PROCEDURE — 85025 COMPLETE CBC W/AUTO DIFF WBC: CPT

## 2023-02-12 PROCEDURE — 2580000003 HC RX 258: Performed by: STUDENT IN AN ORGANIZED HEALTH CARE EDUCATION/TRAINING PROGRAM

## 2023-02-12 PROCEDURE — 6360000002 HC RX W HCPCS: Performed by: INTERNAL MEDICINE

## 2023-02-12 PROCEDURE — 99292 CRITICAL CARE ADDL 30 MIN: CPT | Performed by: INTERNAL MEDICINE

## 2023-02-12 PROCEDURE — 2500000003 HC RX 250 WO HCPCS

## 2023-02-12 PROCEDURE — 94761 N-INVAS EAR/PLS OXIMETRY MLT: CPT

## 2023-02-12 PROCEDURE — 82800 BLOOD PH: CPT

## 2023-02-12 PROCEDURE — 84100 ASSAY OF PHOSPHORUS: CPT

## 2023-02-12 PROCEDURE — 82330 ASSAY OF CALCIUM: CPT

## 2023-02-12 PROCEDURE — 71045 X-RAY EXAM CHEST 1 VIEW: CPT | Performed by: INTERNAL MEDICINE

## 2023-02-12 PROCEDURE — 84484 ASSAY OF TROPONIN QUANT: CPT

## 2023-02-12 PROCEDURE — 85610 PROTHROMBIN TIME: CPT

## 2023-02-12 PROCEDURE — 2500000003 HC RX 250 WO HCPCS: Performed by: EMERGENCY MEDICINE

## 2023-02-12 PROCEDURE — 85730 THROMBOPLASTIN TIME PARTIAL: CPT

## 2023-02-12 PROCEDURE — 36556 INSERT NON-TUNNEL CV CATH: CPT

## 2023-02-12 PROCEDURE — 6360000004 HC RX CONTRAST MEDICATION: Performed by: EMERGENCY MEDICINE

## 2023-02-12 PROCEDURE — 71275 CT ANGIOGRAPHY CHEST: CPT

## 2023-02-12 PROCEDURE — 6360000002 HC RX W HCPCS: Performed by: STUDENT IN AN ORGANIZED HEALTH CARE EDUCATION/TRAINING PROGRAM

## 2023-02-12 PROCEDURE — 2580000003 HC RX 258: Performed by: EMERGENCY MEDICINE

## 2023-02-12 PROCEDURE — 93005 ELECTROCARDIOGRAM TRACING: CPT | Performed by: EMERGENCY MEDICINE

## 2023-02-12 PROCEDURE — 37799 UNLISTED PX VASCULAR SURGERY: CPT

## 2023-02-12 PROCEDURE — 83690 ASSAY OF LIPASE: CPT

## 2023-02-12 PROCEDURE — 2500000003 HC RX 250 WO HCPCS: Performed by: STUDENT IN AN ORGANIZED HEALTH CARE EDUCATION/TRAINING PROGRAM

## 2023-02-12 PROCEDURE — 74177 CT ABD & PELVIS W/CONTRAST: CPT

## 2023-02-12 PROCEDURE — 87040 BLOOD CULTURE FOR BACTERIA: CPT

## 2023-02-12 PROCEDURE — 36620 INSERTION CATHETER ARTERY: CPT | Performed by: INTERNAL MEDICINE

## 2023-02-12 PROCEDURE — 2500000003 HC RX 250 WO HCPCS: Performed by: INTERNAL MEDICINE

## 2023-02-12 PROCEDURE — 85014 HEMATOCRIT: CPT

## 2023-02-12 PROCEDURE — 82565 ASSAY OF CREATININE: CPT

## 2023-02-12 PROCEDURE — 31500 INSERT EMERGENCY AIRWAY: CPT

## 2023-02-12 PROCEDURE — 83735 ASSAY OF MAGNESIUM: CPT

## 2023-02-12 PROCEDURE — 71045 X-RAY EXAM CHEST 1 VIEW: CPT

## 2023-02-12 PROCEDURE — 99285 EMERGENCY DEPT VISIT HI MDM: CPT

## 2023-02-12 PROCEDURE — 6360000002 HC RX W HCPCS: Performed by: EMERGENCY MEDICINE

## 2023-02-12 PROCEDURE — 2000000000 HC ICU R&B

## 2023-02-12 PROCEDURE — 96374 THER/PROPH/DIAG INJ IV PUSH: CPT

## 2023-02-12 PROCEDURE — 2580000003 HC RX 258: Performed by: INTERNAL MEDICINE

## 2023-02-12 PROCEDURE — 80307 DRUG TEST PRSMV CHEM ANLYZR: CPT

## 2023-02-12 PROCEDURE — 83880 ASSAY OF NATRIURETIC PEPTIDE: CPT

## 2023-02-12 PROCEDURE — 99291 CRITICAL CARE FIRST HOUR: CPT | Performed by: INTERNAL MEDICINE

## 2023-02-12 PROCEDURE — 92950 HEART/LUNG RESUSCITATION CPR: CPT

## 2023-02-12 PROCEDURE — 0BH17EZ INSERTION OF ENDOTRACHEAL AIRWAY INTO TRACHEA, VIA NATURAL OR ARTIFICIAL OPENING: ICD-10-PCS | Performed by: EMERGENCY MEDICINE

## 2023-02-12 PROCEDURE — 94002 VENT MGMT INPAT INIT DAY: CPT

## 2023-02-12 PROCEDURE — 86901 BLOOD TYPING SEROLOGIC RH(D): CPT

## 2023-02-12 PROCEDURE — 86900 BLOOD TYPING SEROLOGIC ABO: CPT

## 2023-02-12 PROCEDURE — 5A1935Z RESPIRATORY VENTILATION, LESS THAN 24 CONSECUTIVE HOURS: ICD-10-PCS | Performed by: EMERGENCY MEDICINE

## 2023-02-12 PROCEDURE — 96375 TX/PRO/DX INJ NEW DRUG ADDON: CPT

## 2023-02-12 PROCEDURE — 80053 COMPREHEN METABOLIC PANEL: CPT

## 2023-02-12 PROCEDURE — 99221 1ST HOSP IP/OBS SF/LOW 40: CPT | Performed by: INTERNAL MEDICINE

## 2023-02-12 PROCEDURE — 72125 CT NECK SPINE W/O DYE: CPT

## 2023-02-12 PROCEDURE — 83605 ASSAY OF LACTIC ACID: CPT

## 2023-02-12 PROCEDURE — 84145 PROCALCITONIN (PCT): CPT

## 2023-02-12 PROCEDURE — 82803 BLOOD GASES ANY COMBINATION: CPT

## 2023-02-12 PROCEDURE — 84295 ASSAY OF SERUM SODIUM: CPT

## 2023-02-12 PROCEDURE — 82077 ASSAY SPEC XCP UR&BREATH IA: CPT

## 2023-02-12 PROCEDURE — 36620 INSERTION CATHETER ARTERY: CPT

## 2023-02-12 PROCEDURE — 36415 COLL VENOUS BLD VENIPUNCTURE: CPT

## 2023-02-12 PROCEDURE — 86850 RBC ANTIBODY SCREEN: CPT

## 2023-02-12 PROCEDURE — 82435 ASSAY OF BLOOD CHLORIDE: CPT

## 2023-02-12 PROCEDURE — 2580000003 HC RX 258

## 2023-02-12 PROCEDURE — 84132 ASSAY OF SERUM POTASSIUM: CPT

## 2023-02-12 RX ORDER — EPINEPHRINE 0.1 MG/ML
SYRINGE (ML) INJECTION
Status: COMPLETED | OUTPATIENT
Start: 2023-02-12 | End: 2023-02-12

## 2023-02-12 RX ORDER — SODIUM CHLORIDE 9 MG/ML
INJECTION, SOLUTION INTRAVENOUS
Status: DISPENSED
Start: 2023-02-12 | End: 2023-02-13

## 2023-02-12 RX ORDER — PROPOFOL 10 MG/ML
5-50 INJECTION, EMULSION INTRAVENOUS CONTINUOUS
Status: DISCONTINUED | OUTPATIENT
Start: 2023-02-12 | End: 2023-02-14 | Stop reason: HOSPADM

## 2023-02-12 RX ORDER — 0.9 % SODIUM CHLORIDE 0.9 %
500 INTRAVENOUS SOLUTION INTRAVENOUS ONCE
Status: COMPLETED | OUTPATIENT
Start: 2023-02-12 | End: 2023-02-12

## 2023-02-12 RX ORDER — ROCURONIUM BROMIDE 10 MG/ML
100 INJECTION, SOLUTION INTRAVENOUS ONCE
Status: COMPLETED | OUTPATIENT
Start: 2023-02-12 | End: 2023-02-12

## 2023-02-12 RX ORDER — 0.9 % SODIUM CHLORIDE 0.9 %
1000 INTRAVENOUS SOLUTION INTRAVENOUS ONCE
Status: COMPLETED | OUTPATIENT
Start: 2023-02-12 | End: 2023-02-12

## 2023-02-12 RX ORDER — DEXTROSE MONOHYDRATE 50 MG/ML
INJECTION, SOLUTION INTRAVENOUS CONTINUOUS
Status: DISCONTINUED | OUTPATIENT
Start: 2023-02-12 | End: 2023-02-14 | Stop reason: HOSPADM

## 2023-02-12 RX ORDER — SODIUM CHLORIDE, SODIUM LACTATE, POTASSIUM CHLORIDE, AND CALCIUM CHLORIDE .6; .31; .03; .02 G/100ML; G/100ML; G/100ML; G/100ML
1000 INJECTION, SOLUTION INTRAVENOUS ONCE
Status: COMPLETED | OUTPATIENT
Start: 2023-02-12 | End: 2023-02-12

## 2023-02-12 RX ORDER — ONDANSETRON 2 MG/ML
4 INJECTION INTRAMUSCULAR; INTRAVENOUS EVERY 6 HOURS PRN
Status: DISCONTINUED | OUTPATIENT
Start: 2023-02-12 | End: 2023-02-14 | Stop reason: HOSPADM

## 2023-02-12 RX ORDER — MAGNESIUM SULFATE IN WATER 40 MG/ML
2000 INJECTION, SOLUTION INTRAVENOUS ONCE
Status: COMPLETED | OUTPATIENT
Start: 2023-02-12 | End: 2023-02-12

## 2023-02-12 RX ORDER — CHLORHEXIDINE GLUCONATE 0.12 MG/ML
15 RINSE ORAL 2 TIMES DAILY
Status: DISCONTINUED | OUTPATIENT
Start: 2023-02-12 | End: 2023-02-14 | Stop reason: HOSPADM

## 2023-02-12 RX ORDER — NOREPINEPHRINE BIT/0.9 % NACL 16MG/250ML
1-100 INFUSION BOTTLE (ML) INTRAVENOUS CONTINUOUS
Status: DISCONTINUED | OUTPATIENT
Start: 2023-02-12 | End: 2023-02-14 | Stop reason: HOSPADM

## 2023-02-12 RX ORDER — CALCIUM CHLORIDE 100 MG/ML
INJECTION INTRAVENOUS; INTRAVENTRICULAR
Status: COMPLETED | OUTPATIENT
Start: 2023-02-12 | End: 2023-02-12

## 2023-02-12 RX ORDER — CALCIUM CHLORIDE 100 MG/ML
1000 INJECTION INTRAVENOUS; INTRAVENTRICULAR ONCE
Status: COMPLETED | OUTPATIENT
Start: 2023-02-12 | End: 2023-02-12

## 2023-02-12 RX ORDER — MIDAZOLAM HYDROCHLORIDE 2 MG/2ML
4 INJECTION, SOLUTION INTRAMUSCULAR; INTRAVENOUS ONCE
Status: COMPLETED | OUTPATIENT
Start: 2023-02-12 | End: 2023-02-12

## 2023-02-12 RX ORDER — PROPOFOL 10 MG/ML
5-50 INJECTION, EMULSION INTRAVENOUS ONCE
Status: COMPLETED | OUTPATIENT
Start: 2023-02-12 | End: 2023-02-12

## 2023-02-12 RX ORDER — 0.9 % SODIUM CHLORIDE 0.9 %
2000 INTRAVENOUS SOLUTION INTRAVENOUS ONCE
Status: COMPLETED | OUTPATIENT
Start: 2023-02-12 | End: 2023-02-12

## 2023-02-12 RX ORDER — ONDANSETRON 4 MG/1
4 TABLET, ORALLY DISINTEGRATING ORAL EVERY 8 HOURS PRN
Status: DISCONTINUED | OUTPATIENT
Start: 2023-02-12 | End: 2023-02-14 | Stop reason: HOSPADM

## 2023-02-12 RX ORDER — SODIUM CHLORIDE 9 MG/ML
INJECTION, SOLUTION INTRAVENOUS
Status: COMPLETED
Start: 2023-02-12 | End: 2023-02-12

## 2023-02-12 RX ADMIN — IOPAMIDOL 75 ML: 612 INJECTION, SOLUTION INTRAVENOUS at 07:55

## 2023-02-12 RX ADMIN — Medication 5 MCG/MIN: at 06:58

## 2023-02-12 RX ADMIN — EPINEPHRINE 1 MG: 0.1 INJECTION, SOLUTION ENDOTRACHEAL; INTRACARDIAC; INTRAVENOUS at 13:08

## 2023-02-12 RX ADMIN — AMIODARONE HYDROCHLORIDE 0.5 MG/MIN: 50 INJECTION, SOLUTION INTRAVENOUS at 13:00

## 2023-02-12 RX ADMIN — MIDAZOLAM 4 MG: 1 INJECTION INTRAMUSCULAR; INTRAVENOUS at 08:36

## 2023-02-12 RX ADMIN — SODIUM BICARBONATE: 84 INJECTION, SOLUTION INTRAVENOUS at 20:42

## 2023-02-12 RX ADMIN — Medication 70 MCG/MIN: at 21:38

## 2023-02-12 RX ADMIN — Medication 50 MEQ: at 07:44

## 2023-02-12 RX ADMIN — PROPOFOL 20 MCG/KG/MIN: 10 INJECTION, EMULSION INTRAVENOUS at 07:20

## 2023-02-12 RX ADMIN — SODIUM BICARBONATE 50 MEQ: 84 INJECTION, SOLUTION INTRAVENOUS at 13:08

## 2023-02-12 RX ADMIN — ROCURONIUM BROMIDE 100 MG: 10 INJECTION, SOLUTION INTRAVENOUS at 08:39

## 2023-02-12 RX ADMIN — SODIUM CHLORIDE, POTASSIUM CHLORIDE, SODIUM LACTATE AND CALCIUM CHLORIDE 1000 ML: 600; 310; 30; 20 INJECTION, SOLUTION INTRAVENOUS at 15:58

## 2023-02-12 RX ADMIN — SODIUM CHLORIDE 500 ML: 9 INJECTION, SOLUTION INTRAVENOUS at 10:43

## 2023-02-12 RX ADMIN — SODIUM BICARBONATE 50 MEQ: 84 INJECTION, SOLUTION INTRAVENOUS at 14:10

## 2023-02-12 RX ADMIN — SODIUM BICARBONATE 50 MEQ: 84 INJECTION, SOLUTION INTRAVENOUS at 11:29

## 2023-02-12 RX ADMIN — CALCIUM CHLORIDE 1000 MG: 100 INJECTION, SOLUTION INTRAVENOUS at 13:15

## 2023-02-12 RX ADMIN — SODIUM CHLORIDE 1000 ML: 9 INJECTION, SOLUTION INTRAVENOUS at 11:12

## 2023-02-12 RX ADMIN — PIPERACILLIN AND TAZOBACTAM 3375 MG: 3; .375 INJECTION, POWDER, FOR SOLUTION INTRAVENOUS at 17:19

## 2023-02-12 RX ADMIN — SODIUM CHLORIDE 2000 ML: 9 INJECTION, SOLUTION INTRAVENOUS at 06:39

## 2023-02-12 RX ADMIN — VASOPRESSIN 0.04 UNITS/MIN: 20 INJECTION INTRAVENOUS at 18:13

## 2023-02-12 RX ADMIN — CALCIUM CHLORIDE 1000 MG: 100 INJECTION INTRAVENOUS; INTRAVENTRICULAR at 13:08

## 2023-02-12 RX ADMIN — Medication 2000 ML: at 06:39

## 2023-02-12 RX ADMIN — MAGNESIUM SULFATE HEPTAHYDRATE 2000 MG: 40 INJECTION, SOLUTION INTRAVENOUS at 06:58

## 2023-02-12 RX ADMIN — Medication 50 MCG/MIN: at 17:12

## 2023-02-12 RX ADMIN — VASOPRESSIN 0.03 UNITS/MIN: 20 INJECTION INTRAVENOUS at 13:00

## 2023-02-12 RX ADMIN — Medication 50 MEQ: at 06:39

## 2023-02-12 RX ADMIN — SODIUM CHLORIDE 1000 ML: 9 INJECTION, SOLUTION INTRAVENOUS at 11:10

## 2023-02-12 RX ADMIN — SODIUM BICARBONATE 50 MEQ: 84 INJECTION, SOLUTION INTRAVENOUS at 20:34

## 2023-02-12 RX ADMIN — PROPOFOL 20 MCG/KG/MIN: 10 INJECTION, EMULSION INTRAVENOUS at 18:51

## 2023-02-12 RX ADMIN — FENTANYL CITRATE 150 MCG/HR: 0.05 INJECTION, SOLUTION INTRAMUSCULAR; INTRAVENOUS at 18:50

## 2023-02-12 RX ADMIN — SODIUM BICARBONATE 50 MEQ: 84 INJECTION, SOLUTION INTRAVENOUS at 06:39

## 2023-02-12 RX ADMIN — AMIODARONE HYDROCHLORIDE 1 MG/MIN: 50 INJECTION, SOLUTION INTRAVENOUS at 07:10

## 2023-02-12 RX ADMIN — CALCIUM CHLORIDE 1000 MG: 100 INJECTION INTRAVENOUS; INTRAVENTRICULAR at 13:03

## 2023-02-12 RX ADMIN — SODIUM BICARBONATE 100 MEQ: 84 INJECTION, SOLUTION INTRAVENOUS at 12:43

## 2023-02-12 RX ADMIN — SODIUM BICARBONATE 50 MEQ: 84 INJECTION, SOLUTION INTRAVENOUS at 07:44

## 2023-02-12 RX ADMIN — EPINEPHRINE 2 MCG/MIN: 1 INJECTION INTRAMUSCULAR; INTRAVENOUS; SUBCUTANEOUS at 20:31

## 2023-02-12 RX ADMIN — DEXTROSE MONOHYDRATE: 50 INJECTION, SOLUTION INTRAVENOUS at 18:20

## 2023-02-12 ASSESSMENT — PULMONARY FUNCTION TESTS
PIF_VALUE: 26
PIF_VALUE: 25
PIF_VALUE: 27
PIF_VALUE: 26
PIF_VALUE: 27
PIF_VALUE: 25
PIF_VALUE: 26
PIF_VALUE: 28
PIF_VALUE: 26
PIF_VALUE: 27
PIF_VALUE: 26
PIF_VALUE: 27
PIF_VALUE: 31
PIF_VALUE: 26
PIF_VALUE: 23
PIF_VALUE: 28
PIF_VALUE: 27
PIF_VALUE: 26
PIF_VALUE: 26
PIF_VALUE: 27
PIF_VALUE: 27
PIF_VALUE: 25
PIF_VALUE: 24
PIF_VALUE: 27
PIF_VALUE: 25
PIF_VALUE: 28
PIF_VALUE: 26
PIF_VALUE: 27
PIF_VALUE: 25
PIF_VALUE: 25
PIF_VALUE: 26
PIF_VALUE: 28
PIF_VALUE: 26
PIF_VALUE: 28
PIF_VALUE: 30
PIF_VALUE: 25
PIF_VALUE: 25
PIF_VALUE: 26
PIF_VALUE: 26
PIF_VALUE: 31
PIF_VALUE: 27
PIF_VALUE: 25
PIF_VALUE: 27
PIF_VALUE: 27
PIF_VALUE: 28
PIF_VALUE: 25
PIF_VALUE: 28
PIF_VALUE: 27
PIF_VALUE: 28
PIF_VALUE: 26
PIF_VALUE: 26
PIF_VALUE: 29
PIF_VALUE: 28
PIF_VALUE: 26
PIF_VALUE: 27
PIF_VALUE: 25
PIF_VALUE: 26
PIF_VALUE: 27
PIF_VALUE: 26
PIF_VALUE: 27
PIF_VALUE: 27
PIF_VALUE: 25

## 2023-02-12 ASSESSMENT — PAIN SCALES - GENERAL
PAINLEVEL_OUTOF10: 0

## 2023-02-12 NOTE — CONSULTS
Inpatient consult to Critical Care  Consult performed by: Joyce Smith MD  Consult ordered by: Lorraine Canela DO          Admit Date: 2/12/2023    PCP:  Jolly Tavarez DO         CHIEF COMPLAINT / HPI:                The patient is a 50 y.o. female with unknown past medical history presented with cardiac arrest.  This was witnessed by the boyfriend around 5:30 AM.     All story is from ER documentation. Her boyfriend noticed that the patient collapsed and called EMS. When EMS arrived, she was pulseless. CPR was started. Initial rhythm seems to be PEA with a questionable V-fib/V. tach. .  She received 6 pushes of epi and 300 mg of amnio prior to arrival.  She was intubated prior to arrival to ER. CPR was continued in ER. ER physician believes overall time of resuscitation was 55 minutes. ROSC was achieved in the ER. Was hypotensive and given multiple pushes of epi. She was given bicarb as well. At 6:51 AM she went to V. tach and had CPR again. She was shocked and had epi administered with ROSC later 655. Her initial gases showed significant hypercapnia. Apparently, her ET tube was kinked and she was not getting good tidal volumes. She is not sedated and she is not breathing over the vent. She arrived to the % FiO2 and PEEP of 10. Her most recent gases showed a pH of 6.7, PCO2 of 161 and a PO2 of 80%. She was given additional pushes of bicarbonate by the ER physician. I immediately evaluated her once she arrived to the ICU. The kink in the ET tube was corrected. Tidal volume immediately improved. I increased her rate to 34 and a tidal volume to 380. Her minute ventilation right now is between 12 and 13 L a minute. She is currently not on vasopressors and not on any sedation. Past Medical History:  No past medical history on file. Past Surgical History:    No past surgical history on file.     Current Medications:     chlorhexidine  15 mL Mouth/Throat BID piperacillin-tazobactam  3,375 mg IntraVENous Once    sodium bicarbonate  100 mEq IntraVENous Once    lactated ringers bolus  1,000 mL IntraVENous Once     Home Meds:  Prior to Admission medications    Not on File       Allergies:  Patient has no known allergies. Social History:        TOBACCO:   has no history on file for tobacco use. ETOH:   has no history on file for alcohol use. Family History:   Non pertinent. Review of Systems  Unable to perform. Objective:     PHYSICAL EXAM:      VITALS:  BP (!) 127/106   Pulse (!) 101   Temp 98.6 °F (37 °C) (Bladder)   Resp 23   Wt 147 lb 4.3 oz (66.8 kg)   SpO2 100%   24HR INTAKE/OUTPUT:  No intake or output data in the 24 hours ending 23 1719  CURRENT PULSE OXIMETRY:  SpO2: 100 %  24HR PULSE OXIMETRY RANGE:  SpO2  Av.1 %  Min: 79 %  Max: 100 %    General appearance -ill-appearing  Mental status -unresponsive  Eyes - pupils minimally reactive. Nose - normal and patent   Neck -right IJ central line, supple, no significant adenopathy  Chest -on mechanical ventilation, clear to auscultation, no wheezes, rales or rhonchi, symmetric air entry  Heart -tachycardic and  regular rhythm, normal S1, S2, no murmurs, rubs, clicks or gallops  Abdomen - soft, nontender, nondistended, no masses or organomegaly  Rectal - deferred, not clinically indicated  Neurological -intubated, off sedation. Unresponsive. Does not withdraw to pain. Musculoskeletal - no joint tenderness, deformity or swelling  Extremities - peripheral pulses normal, no pedal edema, no clubbing or cyanosis  Skin -multiple skin tattoos. DATA:    CBC:   Recent Labs     23  0645 23  0837 23  1123 23  1245 23  1600   WBC 23.9*  --   --   --   --    HGB 13.2   < > 15.8 18.3* 18.8*   HCT 41.6  --   --   --   --      --   --   --   --     < > = values in this interval not displayed.      BMP:    Recent Labs     23  0730 23  9717 02/12/23  1131 02/12/23  1245 02/12/23  1326 02/12/23  1600 02/12/23  1629   *  --  149*  --  155*  --  157*   K 3.9  --  5.0*  --  3.5  --  4.0   *  --  117*  --  115*  --  114*   CO2 23  --  23  --  29  --  28   BUN 28*  --  19  --  24*  --  26*   CREATININE 1.35*   < > 1.05*   < > 1.31* 1.5* 1.27*   GLUCOSE 64*  --  110*  --  169*  --  66*   CALCIUM 9.2  --  7.0*  --  12.0*  --  9.4   MG 2.9*  --  2.9*  --  2.9*  --   --    PHOS 11.1*  --  11.8*  --  11.8*  --   --     < > = values in this interval not displayed. HEPATIC:   Recent Labs     02/12/23  0645   *   ALT 40*   BILITOT <0.2   ALKPHOS 90      Recent Labs     02/12/23  0730 02/12/23  1326 02/12/23  1629   TROPONINI 1.160* 1.190* 1.170*        Recent Labs     02/12/23  0645 02/12/23  1131   INR 1.3 1.6            Radiology Review:    CXR portable: Results for orders placed during the hospital encounter of 02/12/23    XR CHEST PORTABLE    Narrative  EXAMINATION:  ONE XRAY VIEW OF THE CHEST    2/12/2023 9:59 am    COMPARISON:  Chest x-ray 02/12/2023    HISTORY:  ORDERING SYSTEM PROVIDED HISTORY: centraline right IJ  TECHNOLOGIST PROVIDED HISTORY:  Reason for exam:->centraline right IJ  Is the patient pregnant?->No  What reading provider will be dictating this exam?->CRC    FINDINGS:  Interval placement right internal jugular central venous catheter terminating  within the mid SVC. Support hardware otherwise unchanged. No postprocedure  pneumothorax. Increase in opacities at the lung bases left greater than  right concerning for layering left effusion development versus airspace  disease    Impression  Interval placement of right internal jugular central venous catheter  terminate within the mid SVC without postprocedure pneumothorax.     Increasing opacifications somewhat layering in the left lower lung concerning  for layering left pleural effusion development new from prior and potential  associated atelectasis or airspace disease at the lung base    Echo:    Assessment/Plan       Impression:    -Cardiopulmonary arrest.  Etiology is unclear. Had a prolonged resuscitation. Suspect respiratory as a primary etiology but cannot rule out cardiac.  -Hypercapnic respiratory failure. PCO2 postintubation is in the 100. Worsening gases due to obstruction and ET tube. This has been fixed upon arrival to the ICU. -Left lung pneumonia upper and lower lobe. -Metabolic acidosis. -Hypernatremia. -Mild hyperkalemia.    -Shock requiring vasopressors.  -Tobacco and alcohol abuse. Recommendations:    -Admitted to the ICU for hemodynamic and airway monitoring.  -Start targeted temperature management protocol.  -Continue vasopressors. Goal map of 65. She is currently on Levophed we will add vasopressin. -Ventilator bundle. Stat changes made to ventilator settings. Increased rate of 34, tidal volume 380, maintaining minute ventilation between 12 and 13 L. Repeat ABG in 1 hour.  -Currently does not need sedation. However, we can add sedation if she started breathing over the vent or for signs of distress.  -Start antibiotic coverage. Obtain sputum culture. -Gentle hydration.  -Strict intake and output management. Insert Rivera catheter.  -Consult neurology. -CT head tomorrow.  -Tight glucose control.  -Replace electrolytes. -DVT and GI prophylaxis. Full Code    Excluding procedures, the total critical care time caring for this patient with life threatening, unstable organ failure, including direct patient contact, review of medical record, management of life support systems, review of data including imaging and labs, discussions with other team members, patient's family and physicians at least 80 minutes so far today. Electronically signed by Justine Whitney MD on 2/12/2023 at 5:19 PM          Addendum:    Patient had another episode of pulseless V-fib arrest and CPR was started.   Had 1 epi, shocked once, bicarbonate push and calcium chloride with return of spontaneous circulation. Continues to have runs of V. tach after that. Blood pressure is marginal.  Vasopressin was added. Stat labs sent. Arterial line line placed. Multiple attempts to place arterial line starting with left radial, wire was not able to pass. Left femoral with inability to pass the wire as well. Eventually, right femoral arterial line was placed. Difficulty passing the wire but eventually was successful. Had a discussion with the sister over the phone regarding CODE STATUS. Mother flying from [place] and she would want to make a decision when she gets here. As of she continues to be full code. Boyfriend is at the bedside and has been updated. He tells me that she always states she would not want aggressive measures if she ends up like this. Additional critical time, excluding procedure and running CPR, was 45 minutes.       Electronically signed by Chris Rand MD on 2/12/2023 at 5:19 PM

## 2023-02-12 NOTE — PROGRESS NOTES
Spiritual Care Services     Summary of Visit:   responded to a Code Blue in ICU. Patient just brought up from ICU. No family initially present.  contacted patient's family. Patient's mother and sister are coming from [place], [place] and are hoping patient can stay alive until they arrive. Patient's significant other arrived and appeared shocked at the situation and overwhelmed trying to coordinate the family's response.  provided a supportive presence to te boyfriend. Encounter Summary  Encounter Overview/Reason : Crisis  Service Provided For[de-identified] Patient  Referral/Consult From[de-identified] Multi-disciplinary team  Support System: Significant other, Parent, Children  Complexity of Encounter: High  Begin Time: 1300  End Time : 1500  Total Time Calculated: 120 min     Crisis  Type: Code Blue                         Spiritual Assessment/Intervention/Outcomes:    Assessment: Shock    Intervention: Sustaining Presence/Ministry of presence    Outcome: Receptive      Care Plan:    Plan and Referrals  Plan/Referrals: Continue to visit, (comment)    Provide continued prayer and support. Spiritual Care Services   Electronically signed by Chaplain Victoriano on 2/12/2023 at 3:53 PM.    To reach a  for emotional and spiritual support, place an [facility] consult request.   If a  is needed immediately, dial 0 and ask to page the on-call .

## 2023-02-12 NOTE — ED NOTES
Pt o2 stat dropping to 86%, respiratory at bedside, respiratory deep suctioned pt, no change in o2 stat. Dr. Chio Banks made aware. No orders at this time.      Daya Costello RN  02/12/23 8216

## 2023-02-12 NOTE — CONSULTS
Consults    Patient Name: Tory Pacheco Date: 2023  6:28 AM  MR #: [id number]  : 1970    Attending Physician: Trudy Woods MD  Reason for consult: Cardiac arrest    History of Presenting Illness:      Gloria Coffey is a 50 y.o. female on hospital day 0 with unknown past medical history admitted to the hospital for cardiac arrest. History Obtained From:electronic medical record    Per medical records patient collapsed at home EMS was called. Very prolonged ROSC achieved after about 15 minutes  In the ED patient was noted to be in V. tach status post shock x1  Currently patient intubated, sedated and on pressors  EKG showing frequent PVCs    History:      No past medical history on file. No past surgical history on file. Family History  No family history on file. [] Unable to obtain due to ventilated and/ or neurologic status  Social History     Socioeconomic History    Marital status:      Spouse name: Not on file    Number of children: Not on file    Years of education: Not on file    Highest education level: Not on file   Occupational History    Not on file   Tobacco Use    Smoking status: Not on file    Smokeless tobacco: Not on file   Substance and Sexual Activity    Alcohol use: Not on file    Drug use: Not on file    Sexual activity: Not on file   Other Topics Concern    Not on file   Social History Narrative    Not on file     Social Determinants of Health     Financial Resource Strain: Not on file   Food Insecurity: Not on file   Transportation Needs: Not on file   Physical Activity: Not on file   Stress: Not on file   Social Connections: Not on file   Intimate Partner Violence: Not on file   Housing Stability: Not on file      [x] Unable to obtain due to ventilated and/ or neurologic status    Home Medications:      No medications prior to admission.     Current Hospital Medications:   Scheduled Meds:   chlorhexidine  15 mL Mouth/Throat BID    piperacillin-tazobactam  3,375 mg IntraVENous Once    sodium bicarbonate  100 mEq IntraVENous Once    calcium chloride  1,000 mg IntraVENous Once    lactated ringers bolus  1,000 mL IntraVENous Once     Continuous Infusions:   norepinephrine 30 mcg/min (02/12/23 1116)    amiodarone 0.5 mg/min (02/12/23 1300)    vasopressin (Septic Shock) infusion      sodium chloride       PRN Meds:.ondansetron **OR** ondansetron   norepinephrine 30 mcg/min (02/12/23 1116)    amiodarone 0.5 mg/min (02/12/23 1300)    vasopressin (Septic Shock) infusion      sodium chloride        Allergies:   No Known Allergies   Review of Systems:     Unable to obtain due to clinical condition    Objective Findings:     Vitals:   Vitals:    02/12/23 1111 02/12/23 1115 02/12/23 1158 02/12/23 1224   BP: (!) 63/50 (!) 67/36     Pulse: 61 63 98 (!) 113   Resp: 24 24 (!) 31 (!) 34   Temp:  98.2 °F (36.8 °C)     TempSrc:  Oral     SpO2: (!) 86% (!) 88%  (!) 88%   Weight:            Physical Examination:  General: Intubated and sedated  HEENT: Normocephalic, no scleral icterus. Neck: No JVD. Heart: Regular, no murmur, no rub/gallop. No RV heave. Lungs: Clear to ascultation, no rales/wheezing/rhonchi. Good chest wall excursion. Abdomen: Soft nontender nondistended  Extremities: No clubbing/cyanosis, no edema. Skin: Warm, dry, normal turgor, no rash, no bruise, no petichiae. Neuro: No myoclonus or tremor.    Psych: Normal affect    Results/ Medications reviewed 2/12/2023, 3:13 PM     Laboratory, Microbiology, Pathology, Radiology, Cardiology, Medications and Transcriptions reviewed  Scheduled Meds:   chlorhexidine  15 mL Mouth/Throat BID    piperacillin-tazobactam  3,375 mg IntraVENous Once    sodium bicarbonate  100 mEq IntraVENous Once    calcium chloride  1,000 mg IntraVENous Once    lactated ringers bolus  1,000 mL IntraVENous Once     Continuous Infusions:   norepinephrine 30 mcg/min (02/12/23 1116)    amiodarone 0.5 mg/min (02/12/23 1300)    vasopressin (Septic Shock) infusion sodium chloride         Recent Labs     02/12/23  0645 02/12/23  0837 02/12/23  1123 02/12/23  1245   WBC 23.9*  --   --   --    HGB 13.2 19.0* 15.8 18.3*   HCT 41.6  --   --   --    .0*  --   --   --      --   --   --      Recent Labs     02/12/23  0645 02/12/23  0837 02/12/23  1131 02/12/23  1245 02/12/23  1326   *  --  149*  --  155*   K 3.5  --  5.0*  --  3.5   *  --  117*  --  115*   CO2 20  --  23  --  29   PHOS  --   --  11.8*  --  11.8*   BUN 12  --  19  --  24*   CREATININE 0.80   < > 1.05* 1.3* 1.31*    < > = values in this interval not displayed. Recent Labs     02/12/23  0645 02/12/23  1131   PROT 3.9*  --    INR 1.3 1.6     No results found for this or any previous visit. Impression:   Cardiac arrest with very prolonged CPR. ROSC after 15 minutes  Frequent ectopy and nonsustained V. tach  BLU  Hypoxic respiratory failure  Positive troponins troponins 1.19  Septic shock  Plan:   Continue ICU  Wean off pressors as tolerated  Okay to continue with amnio drip  If patient continues having sustained V. tach recommend lidocaine drip  Please keep potassium between 4 and 5 magnesium above 2  Please keep hemoglobin above 8  Obtain an echocardiogram  IV antibiotics as per primary team  Continue trending troponins until peak  Cardioprotective medications once patient is more hemodynamically stable  Ischemic evaluation pending neurological recovering  Overall poor prognosis given very prolonged ROSC  Comments: Thank you for allowing us to participate in the care of this patient. Will continue to follow. Please call if questions or concerns arise. Electronically signed by Elian Abel MD on 2/12/2023 at 3:13 PM    Please note this report has been partially produced using speech recognition software and may cause contain errors related to that system including grammar, punctuation and spelling as well as words and phrases that may seem inappropriate.  If there are questions or concerns please feel free to contact me to clarify.

## 2023-02-12 NOTE — ED NOTES
DR. Igor Piedra NOTIFIED OF THE CONSULT VIA PERFECT SERVE AT 1010 ON 2-12  DR. MCGEE CALLED BACK AND SPOKE TO DR. Xiomara Barber  02/12/23 1015

## 2023-02-12 NOTE — ED NOTES
Pt arrived into trauma room on tanmay device with IO in place at [phone]. Pt given 300 amiodarone and 6 of epi in route. Pt cardiac rhythm upon ems arrival to pt's home was asystole. Last known well . Ems glucose-335. /109. Respirations-10. Intubated with size 7 ET tube by ems.      0631- Pulse check- present, ROSC     0633- pulse- 61. 100% bagged with ET tube in place. BP - 102/59. respirations-21     0634- GCS-3. Pupils blown (per Dr. Lupe Tejeda)     3238- BP-47/35 MAP-42 pulse-56 respirations-16     8996- 2 Liters normal saline pressure bagged     0639- Sodium bicarbonate administered     0641- 1 ml of epi administered     0643 BP-44/32. 1 ml of epi administered     0644- 1 ml of epi administered     0646- sodium bicarbonate administered and 1 ml of epi     0648- 1 ml of epi administered     0649- bp 65/44     0651- Pt went into pulseless Ventricular tachycardia, CPR began      0652- shock delivered - 200j     0652- CPR continued and 10ml of epi administered     0654- sodium bicarbonate administered     0655- Pulse check, ROSC. EPI HELD     0657- /92. 99% on ventilator. Pulse-89.  Respirations- 17     0658-Levophed 5mcg and 2g of magnesium started     0710- OG inserted 16fr 55 at the lip                       Felisha Goldberg RN  02/12/23 0741     Felisha Goldberg RN  02/12/23 1433

## 2023-02-12 NOTE — CARE COORDINATION
I contacted the patient's  Luanne Light and gave him the [facility] privacy code and her room number. He states that the patient's sister and mom are on their way. I informed him of how to get to ICU and gave him the nurse's phone number as well as this nurse's phone if they have questions. He states understanding.

## 2023-02-12 NOTE — CONSULTS
Department of Internal Medicine  General Internal Medicine  Attending Consult Note      Reason for Consult:  Medical management  Requesting Physician:  Dr. Charles Enamorado:  faustino cardiac arrest    History Obtained From:  electronic medical record, reason patient could not give history:  on ventilator    HISTORY OF PRESENT ILLNESS:                The patient is a 50 y.o. female with unknown past medical history presented to the ED after cardiac arrest. Pt intubated and no family present so history obtained via EMR and ED documentation. Per ED, pt collapsed and EMS was called. When they arrived, pt was pulseless and ACLS was initiated. Pt was given 6mg epi and 300mg amiodarone prior to arrival to the ED. Pt arrives with CPR in progress. ROSC was obtained. Pt was given IVF and bicarb and levophed started. Pt subsequently went into Vtach arrest for which she was shocked x1 with ROSC. Central line was placed. Labs showed pH 7.0/104/66. Vent was adjusted and repeat ABG was worse with pH 6.7/160/80. Case discussed with cardiology who agreed to accept the admission with consult to hospitalist and intensivist. CTA chest showed possible multifocal pneumonia greater on the left. Pt started on abx, pressors and hypothermia protocol started and pt admitted to the ICU for continued management. Past Medical History:    No past medical history on file. Past Surgical History:    No past surgical history on file.   Current Medications:    Current Facility-Administered Medications: norepinephrine (LEVOPHED) 16 mg in sodium chloride 0.9 % 250 mL infusion, 1-100 mcg/min, IntraVENous, Continuous  amiodarone (CORDARONE) 450 mg in dextrose 5 % 250 mL infusion, 1 mg/min, IntraVENous, Continuous **FOLLOWED BY** amiodarone (CORDARONE) 450 mg in dextrose 5 % 250 mL infusion, 0.5 mg/min, IntraVENous, Continuous  ondansetron (ZOFRAN-ODT) disintegrating tablet 4 mg, 4 mg, Oral, Q8H PRN **OR** ondansetron (ZOFRAN) injection 4 mg, 4 mg, IntraVENous, Q6H PRN  chlorhexidine (PERIDEX) 0.12 % solution 15 mL, 15 mL, Mouth/Throat, BID  piperacillin-tazobactam (ZOSYN) 3,375 mg in sodium chloride 0.9 % 50 mL IVPB (mini-bag), 3,375 mg, IntraVENous, Once  vasopressin 20 Units in sodium chloride 0.9 % 100 mL infusion, 0.01-0.03 Units/min, IntraVENous, Continuous  sodium bicarbonate 8.4 % injection 100 mEq, 100 mEq, IntraVENous, Once  Allergies:  Patient has no known allergies. Social History:    Reported tobacco and alcohol use    Family History:   No family history on file.   REVIEW OF SYSTEMS:    Review of systems not obtained due to patient factors - intubation  PHYSICAL EXAM:      Vitals:    BP (!) 67/36   Pulse (!) 113   Temp 98.2 °F (36.8 °C) (Oral)   Resp (!) 34   Wt 180 lb (81.6 kg)   SpO2 (!) 88%     Constitutional: intubated, sedated  Head: Normocephalic, atraumatic  ENT: ETT  Neck: neck supple, trachea midline  Lungs: coarse breath sounds bilaterally  Heart: RRR, normal S1 and S2  GI: on-distended  MSK: no edema noted  Skin: warm, dry  DATA:    CBC:   Lab Results   Component Value Date/Time    WBC 23.9 02/12/2023 06:45 AM    RBC 3.97 02/12/2023 06:45 AM    HGB 15.8 02/12/2023 11:23 AM    HCT 41.6 02/12/2023 06:45 AM    .0 02/12/2023 06:45 AM    MCH 33.4 02/12/2023 06:45 AM    MCHC 31.8 02/12/2023 06:45 AM    RDW 13.2 02/12/2023 06:45 AM     02/12/2023 06:45 AM     CMP:    Lab Results   Component Value Date/Time     02/12/2023 11:31 AM    K 5.0 02/12/2023 11:31 AM     02/12/2023 11:31 AM    CO2 23 02/12/2023 11:31 AM    BUN 19 02/12/2023 11:31 AM    CREATININE 1.05 02/12/2023 11:31 AM    CREATININE 1.2 02/12/2023 11:23 AM    LABGLOM >60.0 02/12/2023 11:31 AM    GLUCOSE 110 02/12/2023 11:31 AM    PROT 3.9 02/12/2023 06:45 AM    LABALBU 2.4 02/12/2023 06:45 AM    CALCIUM 7.0 02/12/2023 11:31 AM    BILITOT <0.2 02/12/2023 06:45 AM    ALKPHOS 90 02/12/2023 06:45 AM     02/12/2023 06:45 AM    ALT 40 02/12/2023 06:45 AM     Troponin:    Lab Results   Component Value Date/Time    TROPONINI 0.078 02/12/2023 06:45 AM       IMPRESSION/RECOMMENDATIONS:      # Sp Cardiac arrest  - cardiac arrest PTA with ROSC obtained in the ED and arrested again in the ED with VT arrest sp defibrillation and ROSC  - hypothermia protocol  - cardiology, pulm   - intubated PTA - vent management per pulm  - monitor labs  - echo    # Hypoxic and hypercapnic respiratory failure  - intubated by EMS  - vent management per pulm  - ABG with severe mixed acidosis with pH 6.7/160/80  - management per critical care  - sp multiple amps of bicarb in the ED    # Shock  - cardiogenic vs septic  - CTA with multifocal infiltrates  - started on abx - will continue  - procalcitonin elevated  - leukocytosis of 23.9  - follow cultures  - pressors as needed    # Elevated troponin  - likely 2/2 above  - will trend  - cardiology managing    Disposition: Pt sp cardiac arrest x2 with ROSC. Now with severe acidosis and shock on pressors. Cardiology and critical care managing. Poor prognosis. Hypothermia protocol and rewarming before we can assess prognosis, though at this time prognosis is guarded. Pt in critical condition.       Krysta Lee DO  Internal Medicine   Hospitalist    >45 minutes in total care time

## 2023-02-12 NOTE — PROGRESS NOTES
12 noon Received from ER via cart to CCU 11. Unresponsive artic sun. R IJ CVC LIne plus peripheral ivs. Amnio at 33.3 and Levo at 30 Prop at 30. Rivera to CD. L pupil fixed and dialated. R pupil 5 and sluggish. 4 eyes 2 nurses with Niko Andrade RN no skin breakdown. Tatoos all over. Bloody drainage from OG. Vented   1pm Code Blue called due to VT. CPR DR Santana in room. See code notes. 13:30pm FSM inserted due incontinence of light brown stool  1400pm DR Quispe Service inserted ARt line in R groin.  Site without issues

## 2023-02-12 NOTE — ED PROVIDER NOTES
[facility] ED  EMERGENCY DEPARTMENT ENCOUNTER      Pt Name: Helder Lugo  MRN: [id number]  [place] 1970  Date of evaluation: 2/12/2023  Provider: Chio Mclaughlin DO    CHIEF COMPLAINT       Chief Complaint   Patient presents with    Cardiac Arrest           HISTORY OF PRESENT ILLNESS   (Location/Symptom, Timing/Onset, Context/Setting, Quality, Duration, Modifying Factors, Severity)  Note limiting factors. Helder Lugo is a 50 y.o. female [address] [place] [address] who presents to the emergency department in cardiopulmonary arrest.  EMS reports they do not know the name, age or past medical history. They state that they got the call at  but the patient had a witnessed arrest.  They state that the  said that she just simply collapsed. EMS reports that they arrived the patient was pulseless, immediately started CPR, was given 6 mg of epinephrine and 300 mg of amiodarone prior to arrival.  The patient was intubated prior to arrival.  They report she was not in a shockable rhythm at any time. Unable to get further history at this time as the patient presents to the emergency department with CPR in progress with no family members for ancillary information. The patient boyfriend who lives with her then presents to the emergency department. He states that she was on the toilet, making weird noises, she slumped over. He put her on the ground and initiated CPR. He says she has a history of tobacco use, alcohol use and elevated blood blood cell count for which she sees hematology  The history is provided by the EMS personnel. Nursing Notes were reviewed. REVIEW OF SYSTEMS    (2-9 systems for level 4, 10 or more for level 5)     Review of Systems   Unable to perform ROS: Intubated     Except as noted above the remainder of the review of systems was reviewed and negative. PAST MEDICAL HISTORY   No past medical history on file.       SURGICAL HISTORY     No past surgical history on file.      CURRENT MEDICATIONS       Previous Medications    No medications on file       ALLERGIES     Patient has no known allergies. FAMILY HISTORY     No family history on file. SOCIAL HISTORY       Social History     Socioeconomic History    Marital status:        SCREENINGS         [place] Coma Scale  Eye Opening: None  Best Verbal Response: None  Best Motor Response: None  [place] Coma Scale Score: 3                     CIWA Assessment  BP: 106/80  Heart Rate: 85                 PHYSICAL EXAM    (up to 7 for level 4, 8 or more for level 5)     ED Triage Vitals   BP Temp Temp src Pulse Resp SpO2 Height Weight   -- -- -- -- -- -- -- --       Physical Exam  Vitals and nursing note reviewed. Exam conducted with a chaperone present. Constitutional:       Comments: No overt signs of head injury. No palpable skull fracture, rolled with cervical inline stabilization. No midline step-off. No chest wall deformity or abdominal distention. Pelvis stable. Unresponsive   HENT:      Head: Normocephalic and atraumatic. Nose: Nose normal.      Mouth/Throat:      Mouth: Mucous membranes are dry. Eyes:      Comments: Dilated, unresponsive   Cardiovascular:      Comments: Pulseless  Pulmonary:      Comments: Bilateral breath sounds  Abdominal:      General: There is no distension. Tenderness: There is no abdominal tenderness. Musculoskeletal:      Cervical back: No tenderness. Right lower leg: No edema. Left lower leg: No edema. Skin:     Capillary Refill: Capillary refill takes 2 to 3 seconds. Coloration: Skin is pale. Neurological:      Comments: Does not withdraw from pain. Does not follow commands. Psychiatric:      Comments: GCS 3. Intubated.   IO right tibia       DIAGNOSTIC RESULTS     EKG: All EKG's are interpreted by the Emergency Department Physician who either signs or Co-signs this chart in the absence of a cardiologist.    Narrow complex rhythm, rate 58, normal axis, QTc 428, junctional rhythm, no STEMI    Repeat EKG shows sinus rhythm first-degree AV block, rate 91, normal axis, QTc 432, no STEMI, no heart block    RADIOLOGY:   Non-plain film images such as CT, Ultrasound and MRI are read by the radiologist. Plain radiographic images are visualized and preliminarily interpreted by the emergency physician with the below findings:        Interpretation per the Radiologist below, if available at the time of this note:    CT HEAD WO CONTRAST   Final Result   No acute intracranial abnormality. CT CERVICAL SPINE WO CONTRAST   Final Result   1. There is no acute compression fracture or subluxation of the cervical   spine. 2. Multilevel degenerative disc and degenerative joint disease. CTA CHEST W WO CONTRAST   Final Result   1. Multifocal pneumonia seen throughout the left lung more prominent within   the left upper lobe medially. 2. There is no pulmonary embolus. 3. The right lung is clear. CT ABDOMEN PELVIS W IV CONTRAST Additional Contrast? None   Final Result   1. Significant left lower lobe pneumonia. 2. Findings of previous gastric bypass surgery. There are no findings of   obstruction. 3. Large amount of retained stool within the sigmoid colon and rectum   consistent with constipation   4. There is no free air or abscess. XR CHEST PORTABLE   Final Result   Endotracheal tube terminates 8 cm above the level the magdalene. Cardiac size   within normal limits. Minimal interstitial opacities greatest in the left mid   and upper lung concerning for airspace disease. No pneumothorax or pleural   effusion. XR CHEST PORTABLE    (Results Pending)   Portable CXR #2 (ED attending read): Central line in good position. No PTX.        ED BEDSIDE ULTRASOUND:   Performed by ED Physician - none    LABS:  Labs Reviewed   CBC WITH AUTO DIFFERENTIAL - Abnormal; Notable for the following components:       Result Value    WBC 23.9 (*) RBC 3.97 (*)     .0 (*)     MCH 33.4 (*)     MCHC 31.8 (*)     Neutrophils Absolute 13.9 (*)     Lymphocytes Absolute 9.6 (*)     All other components within normal limits   COMPREHENSIVE METABOLIC PANEL - Abnormal; Notable for the following components:    Sodium 150 (*)     Chloride 112 (*)     Anion Gap 18 (*)     Glucose 297 (*)     Calcium 6.7 (*)     Total Protein 3.9 (*)     Albumin 2.4 (*)     ALT 40 (*)      (*)     Globulin 1.5 (*)     All other components within normal limits    Narrative:     CALL  Esteves  [facility] tel. [phone],  trop results called to and read back by april [facility], 02/12/2023 07:58, by  LISETH   LIPASE - Abnormal; Notable for the following components:    Lipase 116 (*)     All other components within normal limits    Narrative:     CALL  Esteves  ED tel. [phone],  trop results called to and read back by april [facility], 02/12/2023 07:58, by  LISETH   PROTIME-INR - Abnormal; Notable for the following components:    Protime 16.7 (*)     All other components within normal limits   APTT - Abnormal; Notable for the following components:    aPTT 37.1 (*)     All other components within normal limits   TROPONIN - Abnormal; Notable for the following components:    Troponin 0.078 (*)     All other components within normal limits    Narrative:     CALL  Esteves  ED tel. [phone],  trop results called to and read back by april [facility], 02/12/2023 07:58, by  LISETH   PROCALCITONIN - Abnormal; Notable for the following components:    Procalcitonin 0.58 (*)     All other components within normal limits   POCT VENOUS - Abnormal; Notable for the following components:    POC Potassium 5.7 (*)     POC Chloride 116 (*)     POC Glucose 435 (*)     Calcium, Ionized 1.03 (*)     pH, Carl 6.821 (*)     pCO2, Carl 58.5 (*)     HCO3, Venous 9.6 (*)     Base Excess, Carl -25 (*)     Lactate 12.29 (*)     Hemoglobin 17.6 (*)     POC Hematocrit 52 (*)     All other components within normal limits   POCT ARTERIAL - Abnormal; Notable for the following components:    POC Sodium 149 (*)     POC Chloride 116 (*)     POC Glucose 189 (*)     pH, Arterial 7.009 (*)     pCO2, Arterial 104 (*)     pO2, Arterial 66 (*)     Base Excess, Arterial -5 (*)     O2 Sat, Arterial 78 (*)     Lactate 3.60 (*)     POC Hematocrit 56 (*)     Hemoglobin 19.0 (*)     All other components within normal limits   POCT GLUCOSE - Normal   POCT CREATININE - Normal   CULTURE, BLOOD 1   CULTURE, BLOOD 1   CULTURE, BLOOD 1   CULTURE, BLOOD 2   MAGNESIUM    Narrative:     CALL  [facility]  [facility] tel. [phone],  trop results called to and read back by Sharon Thuy, 02/12/2023 07:58, by  Alexandr Landin    Narrative:     Rae Nguyen tel. [phone],  trop results called to and read back by eduardo leger, 02/12/2023 07:58, by  [address]   PHOSPHORUS   PHOSPHORUS   CALCIUM, IONIZED   CALCIUM, IONIZED   BLOOD GAS, ARTERIAL   BLOOD GAS, ARTERIAL   LACTIC ACID   APTT   PROTIME-INR   TROPONIN   TROPONIN   BASIC METABOLIC PANEL   MAGNESIUM   PHOSPHORUS   CALCIUM, IONIZED   BLOOD GAS, ARTERIAL   LACTIC ACID   POCT EPOC BLOOD GAS, LACTIC ACID, ICA   POCT EPOC BLOOD GAS, LACTIC ACID, ICA   TYPE AND SCREEN       All other labs were within normal range or not returned as of this dictation. EMERGENCY DEPARTMENT COURSE and DIFFERENTIAL DIAGNOSIS/MDM:   Vitals:    Vitals:    02/12/23 0910 02/12/23 0930 02/12/23 0935 02/12/23 1005   BP:  (!) 136/98 (!) 145/83 106/80   Pulse:  (!) 114 (!) 108 85   Resp:  24 24 24   Temp: 98.8 °F (37.1 °C)      TempSrc: Core      SpO2:  (!) 89% 90% (!) 89%   Weight:               Medical Decision Making  Amount and/or Complexity of Data Reviewed  Labs: ordered. Radiology: ordered. ECG/medicine tests: ordered. Risk  Prescription drug management. Decision regarding hospitalization.     Patient presents emergency department with CPR in progress, cardiopulmonary arrest.  She was intubated in the field prior to arrival, bagging appropriately with equal breath sounds. ACLS protocol followed. Compressions continued. Appropriate IV access obtained. Was given epinephrine prior to arrival and amiodarone prior to arrival.  ROSC achieved on first pulse check      EKG showing junctional rhythm without STEMI  Patient subsequently became hypotensive, given 2 L normal saline bolus, bicarb for acidemia, magnesium for episode of ventricular tachycardia, Levophed drip ordered  Clinical exam not highly suspicious of tension pneumothorax. Ordering CT imaging of head, neck, chest abdomen and pelvis    The patient subsequently went into ventricular tachycardia arrest, required 1 defibrillation, please see nursing charting. ROSC achieved    I spoke to the patient's sister and mother over the phone who reiterated that they would like to continue to keep her as a full code  SPOKE TO DR KIMBLE, NO FURTHER ORDERS AT [facility]    Discussed with Dr. Gilberto Ayoub, critical care, discusseed vent setting, will follow. Suspeted aspiration . However WBC count high, patient young. Dr. Paul Muse ordered a procalcitonin. It ended up elevated. Blood cultures x2 were ordered. IV Zosyn was started. Medicine for medical management. Patient admitted to interventional cardiology. Discussed with Dr. BARDALES [facility], will follow for medical management/pneumonia.        REASSESSMENT      BEHAVIORAL SERVICES: One - Hour In- Person Review  For Management of Violent or Self - Destructive Behavior    Seclusion/Restraint:  SOFT BILAT UPPER     Reason for Intervention: PROTECT AIRWAY    Response to Intervention:  ADEQUATE    Medical Record reviewed and discussed precipitating events/behaviors with RN initiating Intervention:  Yes    Patient Medical Status:  Vital Signs:   Respiratory Status:   Circulatory Status:   Skin Integrity:    Orientation: UNRESPONSIVE    Mood/Affect: UNRESPONSIVE    Speech: UNRESPONSIVE    Thought Content: UNRESPONSIVE    Thought Processes: UNRESPONSIVE    Rationale for continued use of intervention:     Rationale for discontinuing intervention: Patient is able to: One Hour Review Evaluation Physician Notification:  moving continue. CRITICAL CARE TIME   Total Critical Care time was 77 minutes, excluding separately reportable procedures. There was a high probability of clinically significant/life threatening deterioration in the patient's condition which required my urgent intervention. High likelihood of clinical deterioration, frequent rechecks, consultation with family, subspecialty consultation    CONSULTS:  IP CONSULT TO CRITICAL CARE  IP CONSULT TO HOSPITALIST    PROCEDURES:  Unless otherwise noted below, none     Central Line    Date/Time: 2/12/2023 9:58 AM  Performed by: Stephanie Jaramillo DO  Authorized by:  Macho Esquivel MD     Consent:     Consent obtained:  Emergent situation  Universal protocol:     Relevant documents present and verified: yes      Test results available: yes      Imaging studies available: yes      Required blood products, implants, devices, and special equipment available: yes      Site/side marked: yes      Immediately prior to procedure, a time out was called: yes      Patient identity confirmed:  Arm band  Pre-procedure details:     Indication(s): central venous access and hemodynamic monitoring      Indication(s) comment:  S/p cardiac arrest    Skin preparation:  Chlorhexidine with alcohol    Skin preparation agent: Skin preparation agent completely dried prior to procedure    Sedation:     Sedation type:  Deep (Was intubaed already sedated on propofol)  Anesthesia:     Anesthesia method:  None  Procedure details:     Location:  R internal jugular    Site selection rationale:  Access, best landmarks ID on exam and US    Patient position:  Supine    Procedural supplies:  Triple lumen    Catheter size:  7 Fr    Landmarks identified: yes      Ultrasound guidance: yes      Ultrasound guidance timing: real time      Sterile ultrasound techniques: Sterile gel and sterile probe covers were used      Number of attempts:  2    Successful placement: yes    Post-procedure details:     Post-procedure:  Dressing applied and line sutured    Assessment:  Blood return through all ports and placement verified by x-ray    Procedure completion:  Tolerated well, no immediate complications  Comments:      Chest x-ray, good position, no pneumothorax. FINAL IMPRESSION      1. Cardiopulmonary arrest ([address]    2. Ventricular tachycardia    3. Aspiration pneumonia of left lower lobe due to gastric secretions (HCC)    4. Pneumonia of left lower lobe due to infectious organism          DISPOSITION/PLAN   DISPOSITION Admitted 02/12/2023 07:23:47 AM      PATIENT REFERRED TO:  No follow-up provider specified. DISCHARGE MEDICATIONS:  New Prescriptions    No medications on file     Controlled Substances Monitoring:     No flowsheet data found.     (Please note that portions of this note were completed with a voice recognition program.  Efforts were made to edit the dictations but occasionally words are mis-transcribed.)    Wayne Blount DO (electronically signed)  Attending Emergency Physician            Wayne Blount DO  02/12/23 MD George  02/12/23 [id number]

## 2023-02-12 NOTE — CARE COORDINATION
Case Management Assessment  Initial Evaluation    Date/Time of Evaluation: 2/12/2023 12:22 PM  Assessment Completed by: Nirali Serra RN    If patient is discharged prior to next notation, then this note serves as note for discharge by case management. Patient Name: Rebekah Nicolas                   YOB: 1970  Diagnosis: Cardiopulmonary arrest [facility]) [I46.9]  Ventricular tachycardia [I47.20]  Pneumonia of left lower lobe due to infectious organism [J18.9]  Aspiration pneumonia of left lower lobe due to gastric secretions ([facility] [J69.0]                   Date / Time: 2/12/2023  6:28 AM    Patient Admission Status: Inpatient   Readmission Risk (Low < 19, Mod (19-27), High > 27): Readmission Risk Score: 9.5    Current PCP: Sudhir Ye, DO  PCP verified by CM? (P) Yes    Chart Reviewed: Yes      History Provided by: (P) Significant Other  Patient Orientation: (P) Sedated    Patient Cognition: (P) Other (see comment) (on vent)    Hospitalization in the last 30 days (Readmission):  No    If yes, Readmission Assessment in CM Navigator will be completed.     Advance Directives:      Code Status: Full Code   Patient's Primary Decision Maker is:        Discharge Planning:    Patient lives with: (P) Spouse/Significant Other Type of Home: (P) Apartment  Primary Care Giver: (P) Self  Patient Support Systems include: (P) Spouse/Significant Other, Family Members   Current Financial resources:    Current community resources: (P) None  Current services prior to admission: (P) None            Current DME:              Type of Home Care services:       ADLS  Prior functional level: (P) Independent in ADLs/IADLs  Current functional level: (P) Other (see comment) (on vent)    PT AM-PAC:   /24  OT AM-PAC:   /24    Family can provide assistance at DC: (P) Yes  Would you like Case Management to discuss the discharge plan with any other family members/significant others, and if so, who? (P) Yes (HIPPA privacy code given to pt's )  Plans to Return to Present Housing: (P) Yes  Other Identified Issues/Barriers to RETURNING to current housing: not known  Potential Assistance needed at discharge: (P) Other (Comment) (unknown yet)            Potential DME:    Patient expects to discharge to:    Plan for transportation at discharge:      Financial    Payor: [address] / Plan: [address] / Product Type: *No Product type* /     Does insurance require precert for SNF: Yes    Potential assistance Purchasing Medications: (P) No  [facility]Beds request[facility]    Notes:    Factors facilitating achievement of predicted outcomes: Family support    Barriers to discharge: on the ventilator    Additional Case Management Notes: patient's d/c needs are not known since she is on the vent. , sister and mom are supportive, mom and sister are enroute. The Plan for Transition of Care is related to the following treatment goals of Cardiopulmonary arrest [facility]) [I46.9]  Ventricular tachycardia [I47.20]  Pneumonia of left lower lobe due to infectious organism [J18.9]  Aspiration pneumonia of left lower lobe due to gastric secretions ([facility] [address] [K43.2]    IF APPLICABLE: The Patient and/or patient representative Fiona and her family were provided with a choice of provider and agrees with the discharge plan. Freedom of choice list with basic dialogue that supports the patient's individualized plan of care/goals and shares the quality data associated with the providers was provided to:     Patient Representative Name:       The Patient and/or Patient Representative Agree with the Discharge Plan?       Neal Cowden, RN  Case Management Department

## 2023-02-12 NOTE — PROCEDURES
ARTERIAL MONITORING LINE PLACEMENT    2/12/23        INDICATION: Hypotension    SITE: R femoral     CONSENT: Emergent    TIME OUT: taken    STERILE PREP: Complete handwashing care was performed by all involved personnel prior to initiation of the procedure. Full maximum sterile field/barrier technique was followed (with cap and mask, gloves and sterile gown, as well as broad field sterile drapes). Local disinfection was performed with broad field application of orange dyed chloraprep solution, which was allowed to dry fully prior to the initiation of the procedure. LOCAL ANESTHETIC: Patient is unresponsive. ULTRASOUND GUIDANCE USED:yes    PROCEDURE:  Using modified seldinger technique, the appropriate vessel was located with the arrow catheter placed. Wire advanced once \"flash\" returned. Pulsatile flow was verified and a good arterial waveform verified by bedside monitor. The site was reprepped with chlorprep solution followed by a suture to secure the line and placement of a sterile dressing. Hemostasis was assured at the termination of procedure.     COMPLICATIONS: None immediate    Electronically signed by Grace Fishman MD on 2/12/23 at 2:49 PM EST

## 2023-02-12 NOTE — ED NOTES
Pt arrived into trauma room on tanmay device with IO in place. Pt given 300 amiodarone and 6 of epi in route. Pt cardiac rhythm upon ems arrival to pt's home was asystole. Last known well . Ems glucose-335. /109. Respirations-10. Intubated with size 7 ET tube by ems.     0631- Pulse check- present, ROSC    0633- pulse- 61. 100% bagged with ET tube in place. BP - 102/59. respirations-21    3436- GCS-3. Pupils blown (per Dr. Vanesa Prieto)    6412- BP-47/35 MAP-42 pulse-56 respirations-16    2531- 2 Liters normal saline pressure bagged    0639- Sodium bicarbonate administered    0641- 1 ml of epi administered    0643 BP-44/32. 1 ml of epi administered    0644- 1 ml of epi administered    0646- sodium bicarbonate administered and 1 ml of epi    0648- 1 ml of epi administered    0649- bp 65/44    0651- Pt went into pulseless Ventricular tachycardia, CPR began     0652- shock delivered - 200j    0652- CPR continued and 10ml of epi administered    0654- sodium bicarbonate administered    0655- Pulse check, ROSC. EPI HELD    0657- /92. 99% on ventilator. Pulse-89.  Respirations- 17    0658-Levophed 5mcg and 2g of magnesium started    0710- OG inserted 16fr 55 at the [facility] JAMES Goldberg RN  02/12/23 [id number]

## 2023-02-13 ENCOUNTER — APPOINTMENT (OUTPATIENT)
Dept: GENERAL RADIOLOGY | Age: 53
DRG: 308 | End: 2023-02-13
Payer: COMMERCIAL

## 2023-02-13 VITALS
TEMPERATURE: 98.1 F | HEART RATE: 93 BPM | OXYGEN SATURATION: 64 % | BODY MASS INDEX: 24.54 KG/M2 | DIASTOLIC BLOOD PRESSURE: 106 MMHG | SYSTOLIC BLOOD PRESSURE: 127 MMHG | HEIGHT: 65 IN | RESPIRATION RATE: 8 BRPM | WEIGHT: 147.27 LBS

## 2023-02-13 PROBLEM — J93.12 SECONDARY SPONTANEOUS PNEUMOTHORAX: Status: ACTIVE | Noted: 2023-02-13

## 2023-02-13 LAB
ANION GAP SERPL CALCULATED.3IONS-SCNC: 17 MEQ/L (ref 9–15)
ANION GAP SERPL CALCULATED.3IONS-SCNC: 18 MEQ/L (ref 9–15)
ANION GAP SERPL CALCULATED.3IONS-SCNC: 19 MEQ/L (ref 9–15)
ANION GAP SERPL CALCULATED.3IONS-SCNC: 22 MEQ/L (ref 9–15)
BASE EXCESS ARTERIAL: -10 (ref -3–3)
BASE EXCESS ARTERIAL: -11 (ref -3–3)
BASE EXCESS ARTERIAL: -11 (ref -3–3)
BASE EXCESS ARTERIAL: -6 (ref -3–3)
BUN BLDV-MCNC: 39 MG/DL (ref 6–20)
BUN BLDV-MCNC: 42 MG/DL (ref 6–20)
BUN BLDV-MCNC: 45 MG/DL (ref 6–20)
BUN BLDV-MCNC: 49 MG/DL (ref 6–20)
CALCIUM IONIZED: 0.97 MMOL/L (ref 1.12–1.32)
CALCIUM IONIZED: 1.03 MMOL/L (ref 1.12–1.32)
CALCIUM IONIZED: 1.11 MMOL/L (ref 1.12–1.32)
CALCIUM IONIZED: 1.16 MMOL/L (ref 1.12–1.32)
CALCIUM SERPL-MCNC: 6 MG/DL (ref 8.5–9.9)
CALCIUM SERPL-MCNC: 6.5 MG/DL (ref 8.5–9.9)
CALCIUM SERPL-MCNC: 7 MG/DL (ref 8.5–9.9)
CALCIUM SERPL-MCNC: 7.7 MG/DL (ref 8.5–9.9)
CHLORIDE BLD-SCNC: 107 MEQ/L (ref 95–107)
CHLORIDE BLD-SCNC: 108 MEQ/L (ref 95–107)
CHLORIDE BLD-SCNC: 110 MEQ/L (ref 95–107)
CHLORIDE BLD-SCNC: 112 MEQ/L (ref 95–107)
CO2: 19 MEQ/L (ref 20–31)
CO2: 19 MEQ/L (ref 20–31)
CO2: 21 MEQ/L (ref 20–31)
CO2: 21 MEQ/L (ref 20–31)
CREAT SERPL-MCNC: 1.94 MG/DL (ref 0.5–0.9)
CREAT SERPL-MCNC: 2.19 MG/DL (ref 0.5–0.9)
CREAT SERPL-MCNC: 2.38 MG/DL (ref 0.5–0.9)
CREAT SERPL-MCNC: 2.5 MG/DL (ref 0.5–0.9)
EKG ATRIAL RATE: 115 BPM
EKG Q-T INTERVAL: 436 MS
EKG QRS DURATION: 90 MS
EKG QTC CALCULATION (BAZETT): 428 MS
EKG R AXIS: 41 DEGREES
EKG T AXIS: 53 DEGREES
EKG VENTRICULAR RATE: 58 BPM
GFR SERPL CREATININE-BSD FRML MDRD: 22 ML/MIN/{1.73_M2}
GFR SERPL CREATININE-BSD FRML MDRD: 22.7 ML/MIN/{1.73_M2}
GFR SERPL CREATININE-BSD FRML MDRD: 24 ML/MIN/{1.73_M2}
GFR SERPL CREATININE-BSD FRML MDRD: 24.1 ML/MIN/{1.73_M2}
GFR SERPL CREATININE-BSD FRML MDRD: 25 ML/MIN/{1.73_M2}
GFR SERPL CREATININE-BSD FRML MDRD: 26.6 ML/MIN/{1.73_M2}
GFR SERPL CREATININE-BSD FRML MDRD: 30.8 ML/MIN/{1.73_M2}
GFR SERPL CREATININE-BSD FRML MDRD: 34 ML/MIN/{1.73_M2}
GLUCOSE BLD-MCNC: 127 MG/DL (ref 70–99)
GLUCOSE BLD-MCNC: 131 MG/DL (ref 70–99)
GLUCOSE BLD-MCNC: 138 MG/DL (ref 70–99)
GLUCOSE BLD-MCNC: 141 MG/DL (ref 70–99)
GLUCOSE BLD-MCNC: 151 MG/DL (ref 70–99)
GLUCOSE BLD-MCNC: 163 MG/DL (ref 70–99)
GLUCOSE BLD-MCNC: 164 MG/DL (ref 70–99)
GLUCOSE BLD-MCNC: 80 MG/DL (ref 70–99)
GLUCOSE BLD-MCNC: 86 MG/DL (ref 70–99)
HCO3 ARTERIAL: 19.7 MMOL/L (ref 21–29)
HCO3 ARTERIAL: 19.9 MMOL/L (ref 21–29)
HCO3 ARTERIAL: 20.2 MMOL/L (ref 21–29)
HCO3 ARTERIAL: 23.7 MMOL/L (ref 21–29)
HEMOGLOBIN: 15.9 GM/DL (ref 12–16)
HEMOGLOBIN: 16.7 GM/DL (ref 12–16)
HEMOGLOBIN: 17 GM/DL (ref 12–16)
HEMOGLOBIN: 17.2 GM/DL (ref 12–16)
LACTATE: 3.92 MMOL/L (ref 0.4–2)
LACTATE: 5.32 MMOL/L (ref 0.4–2)
LACTATE: 5.97 MMOL/L (ref 0.4–2)
LACTATE: 7.18 MMOL/L (ref 0.4–2)
LACTIC ACID: 6.1 MMOL/L (ref 0.5–2.2)
LACTIC ACID: 7 MMOL/L (ref 0.5–2.2)
LACTIC ACID: 7.1 MMOL/L (ref 0.5–2.2)
MAGNESIUM: 2 MG/DL (ref 1.7–2.4)
MAGNESIUM: 2.2 MG/DL (ref 1.7–2.4)
MAGNESIUM: 2.3 MG/DL (ref 1.7–2.4)
MAGNESIUM: 2.6 MG/DL (ref 1.7–2.4)
O2 SAT, ARTERIAL: 53 % (ref 93–100)
O2 SAT, ARTERIAL: 60 % (ref 93–100)
O2 SAT, ARTERIAL: 67 % (ref 93–100)
O2 SAT, ARTERIAL: 81 % (ref 93–100)
PCO2 ARTERIAL: 72 MM HG (ref 35–45)
PCO2 ARTERIAL: 73 MM HG (ref 35–45)
PCO2 ARTERIAL: 78 MM HG (ref 35–45)
PCO2 ARTERIAL: 82 MM HG (ref 35–45)
PERFORMED ON: ABNORMAL
PH ARTERIAL: 7 (ref 7.35–7.45)
PH ARTERIAL: 7.04 (ref 7.35–7.45)
PH ARTERIAL: 7.05 (ref 7.35–7.45)
PH ARTERIAL: 7.09 (ref 7.35–7.45)
PHOSPHORUS: 10.4 MG/DL (ref 2.3–4.8)
PHOSPHORUS: 10.4 MG/DL (ref 2.3–4.8)
PHOSPHORUS: 9.1 MG/DL (ref 2.3–4.8)
PHOSPHORUS: 9.7 MG/DL (ref 2.3–4.8)
PO2 ARTERIAL: 43 MM HG (ref 75–108)
PO2 ARTERIAL: 46 MM HG (ref 75–108)
PO2 ARTERIAL: 50 MM HG (ref 75–108)
PO2 ARTERIAL: 64 MM HG (ref 75–108)
POC CHLORIDE: 111 MEQ/L (ref 99–110)
POC CHLORIDE: 114 MEQ/L (ref 99–110)
POC CHLORIDE: 117 MEQ/L (ref 99–110)
POC CHLORIDE: 118 MEQ/L (ref 99–110)
POC CREATININE: 1.8 MG/DL (ref 0.6–1.2)
POC CREATININE: 2.3 MG/DL (ref 0.6–1.2)
POC CREATININE: 2.4 MG/DL (ref 0.6–1.2)
POC CREATININE: 2.6 MG/DL (ref 0.6–1.2)
POC FIO2: 100
POC HEMATOCRIT: 47 % (ref 36–48)
POC HEMATOCRIT: 49 % (ref 36–48)
POC HEMATOCRIT: 50 % (ref 36–48)
POC HEMATOCRIT: 51 % (ref 36–48)
POC POTASSIUM: 3.3 MEQ/L (ref 3.5–5.1)
POC POTASSIUM: 3.4 MEQ/L (ref 3.5–5.1)
POC POTASSIUM: 3.8 MEQ/L (ref 3.5–5.1)
POC POTASSIUM: 4 MEQ/L (ref 3.5–5.1)
POC SAMPLE TYPE: ABNORMAL
POC SODIUM: 144 MEQ/L (ref 136–145)
POC SODIUM: 146 MEQ/L (ref 136–145)
POC SODIUM: 148 MEQ/L (ref 136–145)
POC SODIUM: 149 MEQ/L (ref 136–145)
POTASSIUM SERPL-SCNC: 3.4 MEQ/L (ref 3.4–4.9)
POTASSIUM SERPL-SCNC: 3.7 MEQ/L (ref 3.4–4.9)
POTASSIUM SERPL-SCNC: 3.9 MEQ/L (ref 3.4–4.9)
POTASSIUM SERPL-SCNC: 4 MEQ/L (ref 3.4–4.9)
REASON FOR REJECTION: NORMAL
REJECTED TEST: NORMAL
SODIUM BLD-SCNC: 146 MEQ/L (ref 135–144)
SODIUM BLD-SCNC: 146 MEQ/L (ref 135–144)
SODIUM BLD-SCNC: 150 MEQ/L (ref 135–144)
SODIUM BLD-SCNC: 151 MEQ/L (ref 135–144)
TCO2 ARTERIAL: 22 MMOL/L (ref 21–32)
TCO2 ARTERIAL: 23 MMOL/L (ref 21–32)
TCO2 ARTERIAL: 23 MMOL/L (ref 21–32)
TCO2 ARTERIAL: 26 MMOL/L (ref 21–32)

## 2023-02-13 PROCEDURE — 71045 X-RAY EXAM CHEST 1 VIEW: CPT

## 2023-02-13 PROCEDURE — 6370000000 HC RX 637 (ALT 250 FOR IP): Performed by: NURSE PRACTITIONER

## 2023-02-13 PROCEDURE — 6370000000 HC RX 637 (ALT 250 FOR IP): Performed by: INTERNAL MEDICINE

## 2023-02-13 PROCEDURE — 32551 INSERTION OF CHEST TUBE: CPT | Performed by: INTERNAL MEDICINE

## 2023-02-13 PROCEDURE — 87086 URINE CULTURE/COLONY COUNT: CPT

## 2023-02-13 PROCEDURE — 82330 ASSAY OF CALCIUM: CPT

## 2023-02-13 PROCEDURE — 6360000002 HC RX W HCPCS: Performed by: INTERNAL MEDICINE

## 2023-02-13 PROCEDURE — 82565 ASSAY OF CREATININE: CPT

## 2023-02-13 PROCEDURE — 2580000003 HC RX 258: Performed by: EMERGENCY MEDICINE

## 2023-02-13 PROCEDURE — 2500000003 HC RX 250 WO HCPCS: Performed by: EMERGENCY MEDICINE

## 2023-02-13 PROCEDURE — 2580000003 HC RX 258: Performed by: INTERNAL MEDICINE

## 2023-02-13 PROCEDURE — C9113 INJ PANTOPRAZOLE SODIUM, VIA: HCPCS | Performed by: INTERNAL MEDICINE

## 2023-02-13 PROCEDURE — 82803 BLOOD GASES ANY COMBINATION: CPT

## 2023-02-13 PROCEDURE — 2700000000 HC OXYGEN THERAPY PER DAY

## 2023-02-13 PROCEDURE — 37799 UNLISTED PX VASCULAR SURGERY: CPT

## 2023-02-13 PROCEDURE — 0W9930Z DRAINAGE OF RIGHT PLEURAL CAVITY WITH DRAINAGE DEVICE, PERCUTANEOUS APPROACH: ICD-10-PCS | Performed by: INTERNAL MEDICINE

## 2023-02-13 PROCEDURE — 36415 COLL VENOUS BLD VENIPUNCTURE: CPT

## 2023-02-13 PROCEDURE — 02HV33Z INSERTION OF INFUSION DEVICE INTO SUPERIOR VENA CAVA, PERCUTANEOUS APPROACH: ICD-10-PCS | Performed by: INTERNAL MEDICINE

## 2023-02-13 PROCEDURE — 99233 SBSQ HOSP IP/OBS HIGH 50: CPT | Performed by: INTERNAL MEDICINE

## 2023-02-13 PROCEDURE — 94003 VENT MGMT INPAT SUBQ DAY: CPT

## 2023-02-13 PROCEDURE — 99291 CRITICAL CARE FIRST HOUR: CPT | Performed by: INTERNAL MEDICINE

## 2023-02-13 PROCEDURE — 82435 ASSAY OF BLOOD CHLORIDE: CPT

## 2023-02-13 PROCEDURE — 89220 SPUTUM SPECIMEN COLLECTION: CPT

## 2023-02-13 PROCEDURE — 2500000003 HC RX 250 WO HCPCS: Performed by: INTERNAL MEDICINE

## 2023-02-13 PROCEDURE — 84100 ASSAY OF PHOSPHORUS: CPT

## 2023-02-13 PROCEDURE — 6360000002 HC RX W HCPCS: Performed by: EMERGENCY MEDICINE

## 2023-02-13 PROCEDURE — 84295 ASSAY OF SERUM SODIUM: CPT

## 2023-02-13 PROCEDURE — 83735 ASSAY OF MAGNESIUM: CPT

## 2023-02-13 PROCEDURE — 84132 ASSAY OF SERUM POTASSIUM: CPT

## 2023-02-13 PROCEDURE — 6360000002 HC RX W HCPCS

## 2023-02-13 PROCEDURE — 85014 HEMATOCRIT: CPT

## 2023-02-13 PROCEDURE — 83605 ASSAY OF LACTIC ACID: CPT

## 2023-02-13 PROCEDURE — 80048 BASIC METABOLIC PNL TOTAL CA: CPT

## 2023-02-13 PROCEDURE — 94761 N-INVAS EAR/PLS OXIMETRY MLT: CPT

## 2023-02-13 RX ORDER — SUCRALFATE 1 G/1
1 TABLET ORAL 4 TIMES DAILY
COMMUNITY

## 2023-02-13 RX ORDER — HYDROXYZINE 50 MG/1
50 TABLET, FILM COATED ORAL EVERY 6 HOURS PRN
COMMUNITY

## 2023-02-13 RX ORDER — MORPHINE SULFATE 2 MG/ML
2 INJECTION, SOLUTION INTRAMUSCULAR; INTRAVENOUS
Status: DISCONTINUED | OUTPATIENT
Start: 2023-02-13 | End: 2023-02-14 | Stop reason: HOSPADM

## 2023-02-13 RX ORDER — ZAFIRLUKAST 20 MG/1
20 TABLET, FILM COATED ORAL 2 TIMES DAILY
COMMUNITY

## 2023-02-13 RX ORDER — POTASSIUM CHLORIDE 29.8 MG/ML
20 INJECTION INTRAVENOUS
Status: COMPLETED | OUTPATIENT
Start: 2023-02-13 | End: 2023-02-13

## 2023-02-13 RX ORDER — BUSPIRONE HYDROCHLORIDE 15 MG/1
30 TABLET ORAL 2 TIMES DAILY
COMMUNITY

## 2023-02-13 RX ORDER — MORPHINE SULFATE 4 MG/ML
4 INJECTION, SOLUTION INTRAMUSCULAR; INTRAVENOUS ONCE
Status: COMPLETED | OUTPATIENT
Start: 2023-02-13 | End: 2023-02-13

## 2023-02-13 RX ORDER — DULOXETIN HYDROCHLORIDE 30 MG/1
90 CAPSULE, DELAYED RELEASE ORAL DAILY
COMMUNITY

## 2023-02-13 RX ORDER — LAMOTRIGINE 200 MG/1
200 TABLET ORAL 2 TIMES DAILY
COMMUNITY

## 2023-02-13 RX ORDER — HEPARIN SODIUM 5000 [USP'U]/ML
5000 INJECTION, SOLUTION INTRAVENOUS; SUBCUTANEOUS EVERY 8 HOURS SCHEDULED
Status: DISCONTINUED | OUTPATIENT
Start: 2023-02-13 | End: 2023-02-14 | Stop reason: HOSPADM

## 2023-02-13 RX ORDER — MORPHINE SULFATE 4 MG/ML
INJECTION, SOLUTION INTRAMUSCULAR; INTRAVENOUS
Status: COMPLETED
Start: 2023-02-13 | End: 2023-02-13

## 2023-02-13 RX ORDER — OMEPRAZOLE 20 MG/1
40 CAPSULE, DELAYED RELEASE ORAL DAILY
COMMUNITY

## 2023-02-13 RX ADMIN — Medication 70 MCG/MIN: at 14:02

## 2023-02-13 RX ADMIN — Medication 65 MCG/MIN: at 18:19

## 2023-02-13 RX ADMIN — FENTANYL CITRATE 50 MCG/HR: 0.05 INJECTION, SOLUTION INTRAMUSCULAR; INTRAVENOUS at 16:59

## 2023-02-13 RX ADMIN — HYDROCORTISONE SODIUM SUCCINATE 50 MG: 100 INJECTION, POWDER, FOR SOLUTION INTRAMUSCULAR; INTRAVENOUS at 16:41

## 2023-02-13 RX ADMIN — FENTANYL CITRATE 75 MCG/HR: 0.05 INJECTION, SOLUTION INTRAMUSCULAR; INTRAVENOUS at 02:24

## 2023-02-13 RX ADMIN — NITROGLYCERIN 2 INCH: 20 OINTMENT TOPICAL at 16:48

## 2023-02-13 RX ADMIN — DEXTROSE MONOHYDRATE 100 ML/HR: 50 INJECTION, SOLUTION INTRAVENOUS at 06:31

## 2023-02-13 RX ADMIN — VASOPRESSIN 0.04 UNITS/MIN: 20 INJECTION INTRAVENOUS at 04:58

## 2023-02-13 RX ADMIN — POTASSIUM CHLORIDE 20 MEQ: 29.8 INJECTION, SOLUTION INTRAVENOUS at 02:33

## 2023-02-13 RX ADMIN — MORPHINE SULFATE 4 MG: 4 INJECTION, SOLUTION INTRAMUSCULAR; INTRAVENOUS at 20:23

## 2023-02-13 RX ADMIN — EPINEPHRINE 12 MCG/MIN: 1 INJECTION INTRAMUSCULAR; INTRAVENOUS; SUBCUTANEOUS at 04:52

## 2023-02-13 RX ADMIN — NITROGLYCERIN 0.5 INCH: 20 OINTMENT TOPICAL at 11:03

## 2023-02-13 RX ADMIN — PROPOFOL 30 MCG/KG/MIN: 10 INJECTION, EMULSION INTRAVENOUS at 02:21

## 2023-02-13 RX ADMIN — PROPOFOL 20 MCG/KG/MIN: 10 INJECTION, EMULSION INTRAVENOUS at 12:04

## 2023-02-13 RX ADMIN — PIPERACILLIN AND TAZOBACTAM 4500 MG: 4; .5 INJECTION, POWDER, FOR SOLUTION INTRAVENOUS at 11:14

## 2023-02-13 RX ADMIN — Medication 70 MCG/MIN: at 01:45

## 2023-02-13 RX ADMIN — SODIUM BICARBONATE: 84 INJECTION, SOLUTION INTRAVENOUS at 06:34

## 2023-02-13 RX ADMIN — AMIODARONE HYDROCHLORIDE 0.5 MG/MIN: 50 INJECTION, SOLUTION INTRAVENOUS at 16:59

## 2023-02-13 RX ADMIN — HEPARIN SODIUM 5000 UNITS: 5000 INJECTION, SOLUTION INTRAVENOUS; SUBCUTANEOUS at 16:41

## 2023-02-13 RX ADMIN — POTASSIUM CHLORIDE 20 MEQ: 29.8 INJECTION, SOLUTION INTRAVENOUS at 01:19

## 2023-02-13 RX ADMIN — HYDROCORTISONE SODIUM SUCCINATE 50 MG: 100 INJECTION, POWDER, FOR SOLUTION INTRAMUSCULAR; INTRAVENOUS at 11:04

## 2023-02-13 RX ADMIN — Medication 62 MCG/MIN: at 09:09

## 2023-02-13 RX ADMIN — CHLORHEXIDINE GLUCONATE 15 ML: 1.2 RINSE BUCCAL at 11:04

## 2023-02-13 RX ADMIN — VASOPRESSIN 0.04 UNITS/MIN: 20 INJECTION INTRAVENOUS at 13:15

## 2023-02-13 RX ADMIN — DEXTROSE MONOHYDRATE: 50 INJECTION, SOLUTION INTRAVENOUS at 16:40

## 2023-02-13 RX ADMIN — Medication 70 MCG/MIN: at 05:03

## 2023-02-13 RX ADMIN — PIPERACILLIN AND TAZOBACTAM 3375 MG: 3; .375 INJECTION, POWDER, FOR SOLUTION INTRAVENOUS at 16:45

## 2023-02-13 RX ADMIN — SODIUM CHLORIDE, PRESERVATIVE FREE 40 MG: 5 INJECTION INTRAVENOUS at 11:04

## 2023-02-13 ASSESSMENT — PAIN SCALES - GENERAL
PAINLEVEL_OUTOF10: 0

## 2023-02-13 ASSESSMENT — PULMONARY FUNCTION TESTS
PIF_VALUE: 25
PIF_VALUE: 22
PIF_VALUE: 24
PIF_VALUE: 25
PIF_VALUE: 24
PIF_VALUE: 26
PIF_VALUE: 24
PIF_VALUE: 24
PIF_VALUE: 23
PIF_VALUE: 23
PIF_VALUE: 26
PIF_VALUE: 23
PIF_VALUE: 23
PIF_VALUE: 26
PIF_VALUE: 24
PIF_VALUE: 25
PIF_VALUE: 25
PIF_VALUE: 27
PIF_VALUE: 24
PIF_VALUE: 22
PIF_VALUE: 28
PIF_VALUE: 23
PIF_VALUE: 25
PIF_VALUE: 24
PIF_VALUE: 23
PIF_VALUE: 26
PIF_VALUE: 25
PIF_VALUE: 25
PIF_VALUE: 24
PIF_VALUE: 28
PIF_VALUE: 24
PIF_VALUE: 25
PIF_VALUE: 23
PIF_VALUE: 24
PIF_VALUE: 26
PIF_VALUE: 29
PIF_VALUE: 25
PIF_VALUE: 23
PIF_VALUE: 26
PIF_VALUE: 24
PIF_VALUE: 25
PIF_VALUE: 24
PIF_VALUE: 21
PIF_VALUE: 25
PIF_VALUE: 23
PIF_VALUE: 25
PIF_VALUE: 27
PIF_VALUE: 26
PIF_VALUE: 24
PIF_VALUE: 24
PIF_VALUE: 23
PIF_VALUE: 25
PIF_VALUE: 26
PIF_VALUE: 28
PIF_VALUE: 25
PIF_VALUE: 27
PIF_VALUE: 22
PIF_VALUE: 27
PIF_VALUE: 25
PIF_VALUE: 26
PIF_VALUE: 25
PIF_VALUE: 25
PIF_VALUE: 23
PIF_VALUE: 24
PIF_VALUE: 22
PIF_VALUE: 22
PIF_VALUE: 23
PIF_VALUE: 25
PIF_VALUE: 23
PIF_VALUE: 26
PIF_VALUE: 28
PIF_VALUE: 22
PIF_VALUE: 24

## 2023-02-13 NOTE — CARE COORDINATION
Quality round completed with care management team. Boyfriend, sisters and mother at bedside. Pt lives at home with boyfriend. Sister report pt is not , no kids. Legal next of kin would be pt's mother. DC plan TBD. Pending clinical progress.      Electronically signed by BRUNA Lees on 2/13/2023 at 11:26 AM

## 2023-02-13 NOTE — PROCEDURES
CHEST TUBE   [id number]   PROCEDURE PERFORMED:   1. right sided 12-Djiboutian Pigtail chest tube placement. 2. Ultrasound guidance no    CONSENT: The risks and benefits of this procedure were explained to the the patient's mother. All questions were answered and alternative options explained. The patient has been assessed and  is stable to undergo conscious sedation as well as the procedure itself. MEDICATIONS USED:   Lidocaine 1% without epinephrine, total quantity 3 mL. PREOPERATIVE DIAGNOSIS(ES):   1.right secondary spontaneous pneumothorax    POSTPROCEDURE DIAGNOSIS(ES):   1. Same     DESCRIPTION OF PROCEDURE:   Consent was verified as signed and placed upon the chart. Patient was positively identified and procedure site was marked. Time-out was performed by operating team and patient. Vital sign monitoring was accomplished by noninvasive hemodynamic monitoring, pulse oximetry, and telemetry. While on the exam table, the right hemothorax was examined by ultrasound and the diaphragm and pleural fluid were localized. The patient was then prepped and draped in the usual sterile fashion and the skin was anesthetized with 1% lidocaine without epinephrine. A finder need was inserted into the pleural space with return of air  . A 12-Djib[place]ian Pigtail chest tube was then placed to 20 cm in depth using a modified Seldinger technique and serial dilations. The catheter was secured in place with 2-0 silk sutures x 1  and a sterile dressing was applied.     The patient had stable vital signs throughout the entire procedur      ESTIMATED BLOOD LOSS:  less than 5 cc    COMPLICATIONS:  None      Assistant  Helder Kovacs CNP      Electronically signed by Yudith Coulter MD, [facility]   on 2/13/2023 at 12:27 PM

## 2023-02-13 NOTE — PLAN OF CARE
Problem: Safety - Medical Restraint  Goal: Remains free of injury from restraints (Restraint for Interference with Medical Device)  Outcome: Not Progressing     Problem: Discharge Planning  Goal: Discharge to home or other facility with appropriate resources  Outcome: Not Progressing  Flowsheets (Taken 2/12/2023 2000)  Discharge to home or other facility with appropriate resources:   Identify barriers to discharge with patient and caregiver   Arrange for needed discharge resources and transportation as appropriate   Identify discharge learning needs (meds, wound care, etc)   Arrange for interpreters to assist at discharge as needed   Refer to discharge planning if patient needs post-hospital services based on physician order or complex needs related to functional status, cognitive ability or social support system     Problem: Pain  Goal: Verbalizes/displays adequate comfort level or baseline comfort level  Outcome: Not Progressing  Flowsheets (Taken 2/12/2023 2000)  Verbalizes/displays adequate comfort level or baseline comfort level:   Encourage patient to monitor pain and request assistance   Assess pain using appropriate pain scale   Administer analgesics based on type and severity of pain and evaluate response   Implement non-pharmacological measures as appropriate and evaluate response   Consider cultural and social influences on pain and pain management     Problem: Skin/Tissue Integrity  Goal: Absence of new skin breakdown  Outcome: Not Progressing     Problem: ABCDS Injury Assessment  Goal: Absence of physical injury  Outcome: Not Progressing     Problem: Safety - Adult  Goal: Free from fall injury  Outcome: Not Progressing     Problem: Safety - Medical Restraint  Goal: Remains free of injury from restraints (Restraint for Interference with Medical Device)  Outcome: Not Progressing     Problem: Discharge Planning  Goal: Discharge to home or other facility with appropriate resources  Outcome: Not Progressing  Flowsheets (Taken 2/12/2023 2000)  Discharge to home or other facility with appropriate resources:   Identify barriers to discharge with patient and caregiver   Arrange for needed discharge resources and transportation as appropriate   Identify discharge learning needs (meds, wound care, etc)   Arrange for interpreters to assist at discharge as needed   Refer to discharge planning if patient needs post-hospital services based on physician order or complex needs related to functional status, cognitive ability or social support system     Problem: Pain  Goal: Verbalizes/displays adequate comfort level or baseline comfort level  Outcome: Not Progressing  Flowsheets (Taken 2/12/2023 2000)  Verbalizes/displays adequate comfort level or baseline comfort level:   Encourage patient to monitor pain and request assistance   Assess pain using appropriate pain scale   Administer analgesics based on type and severity of pain and evaluate response   Implement non-pharmacological measures as appropriate and evaluate response   Consider cultural and social influences on pain and pain management     Problem: Skin/Tissue Integrity  Goal: Absence of new skin breakdown  Outcome: Not Progressing     Problem: ABCDS Injury Assessment  Goal: Absence of physical injury  Outcome: Not Progressing     Problem: Safety - Adult  Goal: Free from fall injury  Outcome: Not Progressing

## 2023-02-13 NOTE — FLOWSHEET NOTE
Received patient this morning. Patient on arctic sun. Peep 10 100% fio2. Prop at 30 and Fent at 76. At start of shift. RASS -5. No corneal, cough, or gag. No withdrawal to pain noted in any extremities. Patient on vaso, levo, and epi this morning. Began weaning pressors as patient's MAP >65. Epi off by 1100. Levo and vaso titrated to keep map at 65. Bilateral feet and hands blue. No pedal pulses noted, but posterior tibial noted by doppler bilaterally. Left hand had no radial pulse, but a brachial to doppler. R hand had both present- radial and brachial. Nitro paste to be applied to hands and feet like a lotion. Applied as ordered. Propofol weaned off. Neuro assessment unchanged except that patient's breathing became irregular and deep. Almost apneic. Disccused with Dr Everlean Schaumann. Chest x-ray ordered. Chest xray done- Patient has a R pneumothorax per Dr Everlean Schaumann. Consent by mother to place a R chest tube. 14 FR R chest tube inserted at 1230 by Elan Ma NP and Dr Everlean Schaumann. At 1230- levo gtt increased to 65 as patient's MAP fell during chest tube procedure. Dr Everlean Schaumann at the bedside and verbally ordered levophed up to 72. Repeat chest xray done at 1300 to confirm chest tube placement. After chest tube placement, patient FMS leaked. Provided juliann care. Removed clitoral piercing and bilateral nipple piercing for patient skin health. Placed the 3 pieces of body jewelry in a cup with other body piercing. After manipulation of FMS- 2400 cc of stool drained immediately. Kiah Dixon. Foul smelling. Watery. Additional 900 cc drained over the rest of the shift. Patient making low amounts of urine- oliguric. Dr Everlean Schaumann aware, BUN and Creat increasing slowly. Elan Ma NP met with family around 0 per families wishes. Patient made a [address]. No escalation of care from here. No additional vasopressors or drugs to be added.  Are thinking about withdrawal of care- however patient is just now beginning rewarming- unable to complete without patient being rewarmed. Rewarming began at 1300. Patient rested this afternoon. No real arrhythmias. Patient throws PVCs occasionally. Dr Ne Kuhn. Aware of current code status. Discussed plan of care. Nothing to add at this time. Urine culture sent. Attempted to get respiratory culture- nothing being removed from ETT suctioning at this time. Blood work sent frequently. Dr Bre Tinajero aware of critical lactic- no new orders. Critical Calcium called- Dr Bre Tinajero ordered replacement. Dr Bre Tinajero met with family this evening. Family would like to withdrawal care. Hospice consulted. Discussed poor prognosis- family aware. Once discision is make and patient is warmed, may compassionately wean at that time. Family declining hospice as patient would not want it. Call to [facility] at [phone]. Family wants to withdrawal care- updated them. Will update coordinator and will get back to us. Spoke to [employer]- #[phone]. Needs confirmation that patient is not an organ donor. Family arrived with drivers license from [place]- patient is not a registered organ donor. Family is declining their services. No further needs. Report given to [facility]- aware he has to call [address] and update him on organ donation status. And that family wants to compassionately wean. No further needs.

## 2023-02-13 NOTE — FLOWSHEET NOTE
Spoke with family members at bedside including mother and 1 of patient's sisters. They would not like chest compressions or resuscitative medications if her heart would stop. Explained that we would keep all treatments currently and if her heart stops we would not do any heroic measures to bring it back. They all voiced an understanding. Increases to Levophed were needed talk to the family members regarding not escalating care or starting more medications for her blood pressure. They were all in agreement to just leave all treatments the same with no escalation of care.

## 2023-02-13 NOTE — CARE COORDINATION
Team ICU rounds done and multiple family members in room as well as boyfriend per report. Per notes pt is critical with poor prognosis. She is full code and on multiple drips at present.

## 2023-02-13 NOTE — PROGRESS NOTES
Pulmonary & Critical Care Medicine ICU Progress Note  Chief complaint : Postcardiac arrest    Subjunctive/24 hour events :   Patient seen and examined during multidisciplinary rounds with RN, charge nurse, RT, pharmacy, dietitian, and social service. Vent, unresponsive, she is on amiodarone drip, epinephrine drip, Levophed drip and vasopressin, she is on fentanyl and propofol, currently on 100% FiO2, 10 of PEEP, saturation 96%, no reported vomiting, urine output 740 cc, +2.3 L,no bowel movement        New information updated in the note today, rest of the examination did not change compared to yesterday. Social History     Tobacco Use    Smoking status: Not on file    Smokeless tobacco: Not on file   Substance Use Topics    Alcohol use: Not on file     No family history on file. Recent Labs     02/13/23  0001 02/13/23 0410   PHART 7.044* 6.995*   QSH3XYI 72* 82*   PO2ART 46* 43*       MV Settings:  Vent Mode: AC/VC Resp Rate (Set): 34 bmp/Vt (Set, mL): 380 mL/ /FiO2 : 100 %           IV:   norepinephrine 70 mcg/min (02/13/23 0756)    amiodarone 0.5 mg/min (02/13/23 0756)    vasopressin (Septic Shock) infusion 0.04 Units/min (02/13/23 0756)    dextrose 100 mL/hr at 02/13/23 0756    propofol 30 mcg/kg/min (02/13/23 0756)    fentaNYL (SUBLIMAZE) infusion 75 mcg/hr (02/13/23 0756)    sodium bicarbonate infusion 100 mL/hr at 02/13/23 0756    EPINEPHrine 12 mcg/min (02/13/23 0756)       Vitals:  BP (!) 127/106   Pulse 83   Temp 98.1 °F (36.7 °C) (Bladder)   Resp 23   Ht 5' 5\" (1.651 m)   Wt 147 lb 4.3 oz (66.8 kg)   SpO2 100%   BMI 24.51 kg/m²    Tmax:        Intake/Output Summary (Last 24 hours) at 2/13/2023 0846  Last data filed at 2/13/2023 0756  Gross per 24 hour   Intake 6474.47 ml   Output 840 ml   Net 5634.47 ml       EXAM:  General: Unresponsive, on vent  Head: normocephalic, atraumatic  Eyes:No gross abnormalities.   ENT:  MMM no lesions  Neck:  supple and no masses  Chest : Few rales, no wheezing, good air movement, vent sounds, nontender, tympanic  Heart[de-identified] Heart sounds are normal.  Regular rate and rhythm without murmur, gallop or rub. ABD:  symmetric, soft, non-tender, no apparent guarding or rebound  Musculoskeletal : no cyanosis, no clubbing, and cyanotic fingers and toes  Neuro: Unresponsive  Skin: No rashes or nodules noted. Lymph node:  no cervical nodes  Urology: Yes Rivera   Psychiatric: unresponsive    Medications:  Scheduled Meds:   chlorhexidine  15 mL Mouth/Throat BID    sodium bicarbonate  100 mEq IntraVENous Once       PRN Meds:  ondansetron **OR** ondansetron    Results: reviewed by me   CBC:   Recent Labs     02/12/23  0645 02/12/23  0837 02/12/23 2012 02/13/23 0001 02/13/23 0410   WBC 23.9*  --   --   --   --    HGB 13.2   < > 18.0* 17.0* 17.2*   HCT 41.6  --   --   --   --    .0*  --   --   --   --      --   --   --   --     < > = values in this interval not displayed. BMP:   Recent Labs     02/12/23  1326 02/12/23  1600 02/12/23  1629 02/12/23  1814 02/12/23  1930 02/12/23 2012 02/12/23  2309 02/13/23 0001 02/13/23  0410   *  --  157*  --  151*  --  150*  --   --    K 3.5  --  4.0  --  3.9  --  3.4  --   --    *  --  114*  --  110*  --  112*  --   --    CO2 29  --  28  --  19*  --  19*  --   --    PHOS 11.8*  --   --   --  10.4*  --  10.4*  --   --    BUN 24*  --  26*  --  42*  --  39*  --   --    CREATININE 1.31*   < > 1.27*   < > 2.19*   < > 1.94* 1.8* 2.3*    < > = values in this interval not displayed.      LIVER PROFILE:   Recent Labs     02/12/23  0645   *   ALT 40*   LIPASE 116*   BILITOT <0.2   ALKPHOS 90     PT/INR:   Recent Labs     02/12/23  0645 02/12/23  1131   PROTIME 16.7* 19.2*   INR 1.3 1.6     APTT:   Recent Labs     02/12/23  0645 02/12/23  1131   APTT 37.1* 46.5*     UA:No results for input(s): NITRITE, COLORU, PHUR, LABCAST, WBCUA, RBCUA, MUCUS, TRICHOMONAS, YEAST, BACTERIA, CLARITYU, SPECGRAV, LEUKOCYTESUR, UROBILINOGEN, BILIRUBINUR, BLOODU, GLUCOSEU, AMORPHOUS in the last 72 hours. Invalid input(s): Susanna Irizarry    Cultures:  Negative so far  Films:  CXR reviewed by me and it showed left lower lobe infiltrate  CT chest shows left multilobar infiltrate  CT head negative    Assessment: This is a critically ill patient at risk of deterioration / death , needing close ICU monitoring and intervention due to below noted problems   Cardiopulmonary arrest, V. tach arrest likely cardiac ischemia  Shock, likely combination of septic and cardiogenic  Acute hypoxic and hypercapnic respiratory failure  Multilobar pneumonia  Hypernatremia  BLU  Lactic acidosis  Non-ST elevation MI  Polycythemia  Coagulopathy    Recommendation  Vent support lung protective strategy  head of the bed 30°  Daily sedation holidays and breathing trials  Sedation with combination propofol and fentanyl target R ASS of 0 to -1  Watch for ICU delirium: TV on, natural light, avoid benzos, pain control, early mobility, and family engagement  PUD prophylaxis  DVT prophylaxis  Target blood sugar 140-180  Levophed/vasopressin/epinephrine to maintain MAP ~65-70  Monitor and replace electrolyte as needed  Volume resuscitation   Continue bicarb drip   Continue amiodarone drip  Empiric Zosyn   Panculture   Repeat CT head after rewarming   Targeted hypothermia protocol   Monitor and replace electrolyte as needed  Solu-Cortef 50 mg IV every 6 hours  Prognosis poor    Discussed with patient family  Due to the immediate potential for life-threatening deterioration due to postcardiac arrest I spent 40  minutes providing critical care. This time is excluding time spent performing procedures.           Electronically signed by Yudith Coulter MD,  [facility] ,on 2/13/2023 at 8:46 AM

## 2023-02-13 NOTE — PROGRESS NOTES
Progress note cardiology    Patient Name: Carol Ann Lai  Admit Date: 2023  6:28 AM  MR #: [id number]  : 1970    Attending Physician: Alma Cheatham MD  Reason for consult: Cardiac arrest    History of Presenting Illness:      Carol Ann Lai is a 50 y.o. female on hospital day 1 with unknown past medical history admitted to the hospital for cardiac arrest. History Obtained From:electronic medical record    Per medical records patient collapsed at home EMS was called. Very prolonged ROSC achieved after about 15 minutes  In the ED patient was noted to be in V. tach status post shock x1  Currently patient intubated, sedated and on pressors  EKG showing frequent PVCs  ================  Hospital course  2023  Patient still intubated and sedated  Remains on pressors  On amiodarone for suppression of V. tach  Telemetry sinus rhythm with occasional PVCs    History:      History reviewed. No pertinent past medical history. History reviewed. No pertinent surgical history. Family History  No family history on file.   [x] Unable to obtain due to ventilated and/ or neurologic status  Social History     Socioeconomic History    Marital status:      Spouse name: Not on file    Number of children: Not on file    Years of education: Not on file    Highest education level: Not on file   Occupational History    Not on file   Tobacco Use    Smoking status: Not on file    Smokeless tobacco: Not on file   Substance and Sexual Activity    Alcohol use: Not on file    Drug use: Not on file    Sexual activity: Not on file   Other Topics Concern    Not on file   Social History Narrative    Not on file     Social Determinants of Health     Financial Resource Strain: Not on file   Food Insecurity: Not on file   Transportation Needs: Not on file   Physical Activity: Not on file   Stress: Not on file   Social Connections: Not on file   Intimate Partner Violence: Not on file   Housing Stability: Not on file      [x] Unable to obtain due to ventilated and/ or neurologic status    Home Medications:      No medications prior to admission. Current Hospital Medications:   Scheduled Meds:   chlorhexidine  15 mL Mouth/Throat BID    sodium bicarbonate  100 mEq IntraVENous Once     Continuous Infusions:   norepinephrine 62 mcg/min (02/13/23 0910)    amiodarone 0.5 mg/min (02/13/23 0910)    vasopressin (Septic Shock) infusion 0.04 Units/min (02/13/23 0910)    dextrose 100 mL/hr at 02/13/23 0910    propofol 20 mcg/kg/min (02/13/23 0910)    fentaNYL (SUBLIMAZE) infusion 75 mcg/hr (02/13/23 0910)    sodium bicarbonate infusion 100 mL/hr at 02/13/23 0910    EPINEPHrine 10 mcg/min (02/13/23 0910)     PRN Meds:.ondansetron **OR** ondansetron   norepinephrine 62 mcg/min (02/13/23 0910)    amiodarone 0.5 mg/min (02/13/23 0910)    vasopressin (Septic Shock) infusion 0.04 Units/min (02/13/23 0910)    dextrose 100 mL/hr at 02/13/23 0910    propofol 20 mcg/kg/min (02/13/23 0910)    fentaNYL (SUBLIMAZE) infusion 75 mcg/hr (02/13/23 0910)    sodium bicarbonate infusion 100 mL/hr at 02/13/23 0910    EPINEPHrine 10 mcg/min (02/13/23 0910)      Allergies:   No Known Allergies   Review of Systems:     Unable to obtain due to clinical condition    Objective Findings:     Vitals:   Vitals:    02/13/23 0715 02/13/23 0730 02/13/23 0745 02/13/23 0747   BP:       Pulse: 86 84 83 83   Resp: (!) 32 (!) 34 (!) 32 23   Temp:       TempSrc:       SpO2:       Weight:       Height:            Physical Examination:  General: Intubated and sedated  HEENT: Normocephalic, no scleral icterus. Neck: No JVD. Heart: Regular, no murmur, no rub/gallop. No RV heave. Lungs: Clear to ascultation, no rales/wheezing/rhonchi. Good chest wall excursion. Abdomen: Soft nontender nondistended  Extremities: No clubbing/cyanosis, no edema. Skin: Warm, dry, normal turgor, no rash, no bruise, no petichiae. Neuro: No myoclonus or tremor.    Psych: Normal affect    Results/ Medications reviewed 2/13/2023, 9:32 AM     Laboratory, Microbiology, Pathology, Radiology, Cardiology, Medications and Transcriptions reviewed  Scheduled Meds:   chlorhexidine  15 mL Mouth/Throat BID    sodium bicarbonate  100 mEq IntraVENous Once     Continuous Infusions:   norepinephrine 62 mcg/min (02/13/23 0910)    amiodarone 0.5 mg/min (02/13/23 0910)    vasopressin (Septic Shock) infusion 0.04 Units/min (02/13/23 0910)    dextrose 100 mL/hr at 02/13/23 0910    propofol 20 mcg/kg/min (02/13/23 0910)    fentaNYL (SUBLIMAZE) infusion 75 mcg/hr (02/13/23 0910)    sodium bicarbonate infusion 100 mL/hr at 02/13/23 0910    EPINEPHrine 10 mcg/min (02/13/23 0910)       Recent Labs     02/12/23  0645 02/12/23  0837 02/12/23 2012 02/13/23 0001 02/13/23 0410   WBC 23.9*  --   --   --   --    HGB 13.2   < > 18.0* 17.0* 17.2*   HCT 41.6  --   --   --   --    .0*  --   --   --   --      --   --   --   --     < > = values in this interval not displayed. Recent Labs     02/12/23  1326 02/12/23  1600 02/12/23  1629 02/12/23  1814 02/12/23  1930 02/12/23 2012 02/12/23  2309 02/13/23 0001 02/13/23 0410   *  --  157*  --  151*  --  150*  --   --    K 3.5  --  4.0  --  3.9  --  3.4  --   --    *  --  114*  --  110*  --  112*  --   --    CO2 29  --  28  --  19*  --  19*  --   --    PHOS 11.8*  --   --   --  10.4*  --  10.4*  --   --    BUN 24*  --  26*  --  42*  --  39*  --   --    CREATININE 1.31*   < > 1.27*   < > 2.19*   < > 1.94* 1.8* 2.3*    < > = values in this interval not displayed. Recent Labs     02/12/23  0645 02/12/23  1131   PROT 3.9*  --    INR 1.3 1.6       No results found for this or any previous visit. Impression:   Cardiac arrest with very prolonged CPR.   ROSC after 15 minutes  Frequent ectopy and nonsustained V. tach  BLU  Hypoxic respiratory failure  Positive troponins troponins 1.19  Septic shock  Plan:   Continue ICU  Wean off pressors as tolerated  Okay to continue with amnio drip  If patient continues having sustained V. tach recommend lidocaine drip  Please keep potassium between 4 and 5 magnesium above 2  Please keep hemoglobin above 8  Obtain an echocardiogram  IV antibiotics as per primary team  Continue trending troponins until peak  Cardioprotective medications once patient is more hemodynamically stable  Ischemic evaluation pending neurological recovering  Overall poor prognosis given very prolonged ROSC  Comments: Thank you for allowing us to participate in the care of this patient. Will continue to follow. Please call if questions or concerns arise. Electronically signed by Kathlen Goodpasture, MD on 2/13/2023 at 9:32 AM    Please note this report has been partially produced using speech recognition software and may cause contain errors related to that system including grammar, punctuation and spelling as well as words and phrases that may seem inappropriate. If there are questions or concerns please feel free to contact me to clarify.

## 2023-02-13 NOTE — FLOWSHEET NOTE
1930:  Handoff of care r3c'd from [address] [place] [address]. Assessment initiated. See flowsheets. No response to tactile/painful stimulation. Extremities cold and purple. 1950:  Dr. Bethel Park here and reviewed ABGs. Orders rec'd for sodium bicarb, etc.  BP as documented. Alfredo Dias RN, respiratory therapy, Dr. Bethel Park, and myself at bedside. 2000:  Remains cooling o arctic sun. Oral care given. Family expected to arrive about 21  or so. 2230:  Dr. Bethel Park here. 2245: Family here. Updated on current situation and consistently low BP despite medications. Mother, 2 sisters, boyfriend, and niece at bedside at intervals. 2255:  Dr. Bethel Park here and explained criticalness of current situation. 2300:  Labs sent. 0030:  Oral care given. Dr. Analy Dempsey updated of most current labs, ABGs, and current  assessment. Orders recd. 0400:  Oral care given. Labs drawn and sent. Reassessment completed. [id number] Sine referral made.

## 2023-02-14 LAB — URINE CULTURE, ROUTINE: NORMAL

## 2023-02-14 NOTE — DEATH NOTES
Death Pronouncement Note  Patient's Name: Frieda Camacho   Patient's YOB: 1970  MRN Number: [id number]    Admitting Provider: Jose Urban MD  Attending Provider: Jose Urban MD    Patient was examined and the following were absent: Pulses, Blood Pressure, and Respiratory effort    I declared the patient dead on  at 2040    Preliminary Cause of Death:   cardiac arrest 2/2 ventricular tachycardia.      Electronically signed by Tasha Riley DO on 2/13/23 at 8:52 PM EST

## 2023-02-14 NOTE — PROGRESS NOTES
Hospitalist Progress Note      Date of Admission: 2/12/2023  Chief Complaint:    Chief Complaint   Patient presents with    Cardiac Arrest     Subjective: Intubated, sedated. Medications:    Infusion Medications    norepinephrine 65 mcg/min (02/13/23 1833)    amiodarone 0.5 mg/min (02/13/23 1833)    vasopressin (Septic Shock) infusion 0.04 Units/min (02/13/23 1833)    dextrose 100 mL/hr at 02/13/23 1833    propofol 10 mcg/kg/min (02/13/23 1833)    fentaNYL (SUBLIMAZE) infusion 50 mcg/hr (02/13/23 1833)    sodium bicarbonate infusion 100 mL/hr at 02/13/23 1833    EPINEPHrine Stopped (02/13/23 1055)     Scheduled Medications    heparin (porcine)  5,000 Units SubCUTAneous 3 times per day    piperacillin-tazobactam  3,375 mg IntraVENous Q8H    pantoprazole (PROTONIX) 40 mg injection  40 mg IntraVENous Daily    hydrocortisone sodium succinate PF  50 mg IntraVENous Q6H    nitroglycerin  2 inch Topical 4 times per day    calcium gluconate IVPB  1,000 mg IntraVENous Once    chlorhexidine  15 mL Mouth/Throat BID    sodium bicarbonate  100 mEq IntraVENous Once     PRN Meds: ondansetron **OR** ondansetron    Intake/Output Summary (Last 24 hours) at 2/13/2023 1936  Last data filed at 2/13/2023 1833  Gross per 24 hour   Intake 7781.46 ml   Output 2690 ml   Net 5091.46 ml     Exam:  BP (!) 127/106   Pulse 94   Temp 98.1 °F (36.7 °C) (Bladder)   Resp 22   Ht 5' 5\" (1.651 m)   Wt 147 lb 4.3 oz (66.8 kg)   SpO2 (!) 64%   BMI 24.51 kg/m²   Head: Normocephalic, atraumatic  Sclera clear  Neck JVD flat  Lungs: bilat air movement, intubated. Labs:   Recent Labs     02/12/23  0645 02/12/23  0837 02/13/23  0410 02/13/23  0941 02/13/23  1259   WBC 23.9*  --   --   --   --    HGB 13.2   < > 17.2* 16.7* 15.9   HCT 41.6  --   --   --   --      --   --   --   --     < > = values in this interval not displayed.      Recent Labs     02/12/23  0645 02/12/23  0730 02/12/23  2309 02/13/23  0001 02/13/23  0730 02/13/23  4771 02/13/23  1259 02/13/23  1330   *   < > 150*  --  146*  --   --  146*   K 3.5   < > 3.4  --  3.7  --   --  4.0   *   < > 112*  --  108*  --   --  107   CO2 20   < > 19*  --  21  --   --  21   BUN 12   < > 39*  --  45*  --   --  49*   CREATININE 0.80   < > 1.94*   < > 2.38* 2.4* 2.6* 2.50*   CALCIUM 6.7*   < > 7.7*  --  6.5*  --   --  6.0*   PHOS  --    < > 10.4*  --  9.7*  --   --  9.1*   *  --   --   --   --   --   --   --    ALT 40*  --   --   --   --   --   --   --    BILITOT <0.2  --   --   --   --   --   --   --    ALKPHOS 90  --   --   --   --   --   --   --     < > = values in this interval not displayed. Recent Labs     02/12/23  0645 02/12/23  1131   INR 1.3 1.6     Recent Labs     02/12/23  0730 02/12/23  1326 02/12/23  1629   TROPONINI 1.160* 1.190* 1.170*     Radiology:  XR CHEST PORTABLE   Final Result   Interval placement of a right chest tube with significant improvement and   near resolution of the right pneumothorax. Persistent atelectasis/infiltrates in the right lung base. XR CHEST PORTABLE   Final Result      XR CHEST PORTABLE   Final Result   Nearly 25% right pneumothorax with atelectasis/infiltrates in the lung bases. Critical results were called by Dr. Carmelo Zepeda to Dr. Reuben Hoang. On   2/13/2023 at 12:43. XR CHEST PORTABLE   Final Result   1. Stable probable left pleural effusion. 2. Stable probable atelectatic change in the right middle lobe. XR CHEST PORTABLE   Final Result   Interval placement of right internal jugular central venous catheter   terminate within the mid SVC without postprocedure pneumothorax. Increasing opacifications somewhat layering in the left lower lung concerning   for layering left pleural effusion development new from prior and potential   associated atelectasis or airspace disease at the lung base         CT HEAD WO CONTRAST   Final Result   No acute intracranial abnormality.          CT CERVICAL SPINE WO CONTRAST   Final Result   1. There is no acute compression fracture or subluxation of the cervical   spine. 2. Multilevel degenerative disc and degenerative joint disease. CTA CHEST W WO CONTRAST   Final Result   1. Multifocal pneumonia seen throughout the left lung more prominent within   the left upper lobe medially. 2. There is no pulmonary embolus. 3. The right lung is clear. CT ABDOMEN PELVIS W IV CONTRAST Additional Contrast? None   Final Result   1. Significant left lower lobe pneumonia. 2. Findings of previous gastric bypass surgery. There are no findings of   obstruction. 3. Large amount of retained stool within the sigmoid colon and rectum   consistent with constipation   4. There is no free air or abscess. XR CHEST PORTABLE   Final Result   Endotracheal tube terminates 8 cm above the level the magdalene. Cardiac size   within normal limits. Minimal interstitial opacities greatest in the left mid   and upper lung concerning for airspace disease. No pneumothorax or pleural   effusion. CT HEAD WO CONTRAST    (Results Pending)     Assessment/Plan:    Acute hypoxic respiratory failure, vent dependent. Following with pulmonology who is primarily managing. Cardiopulmonary arrest secondary to ventricular arrhythmia. [place] arrest.  Cardiology following. Possible pneumonia: Following with pulmonology, who is primarily managing. Hypothermic protocol management as per critical care. Lactic acidosis: Improving  Hypernatremia: Downtrending  BLU: Likely secondary to hypoperfusion. Continue to monitor. Pressure support as required  Shock:  On pressors, hemodynamics being managed by critical care and cardiology especially given concern of cardiogenic component    36 minutes critical care time    Delphine Mckeon MD ,MD

## 2023-02-14 NOTE — PROGRESS NOTES
2027 - Extubated to room air, all gtt's stopped,  4mg morphine given  2040 - TOD, family at bedside. Dr. Rogers Sirnoah to pronounce.

## 2023-02-15 LAB
CALCIUM IONIZED, CALC AT PH 7.4: 0.81 MMOL/L (ref 1.11–1.3)
CALCIUM IONIZED, CALC AT PH 7.4: 0.86 MMOL/L (ref 1.11–1.3)
CALCIUM IONIZED, CALC AT PH 7.4: 0.95 MMOL/L (ref 1.11–1.3)
CALCIUM IONIZED, CALC AT PH 7.4: 0.97 MMOL/L (ref 1.11–1.3)
CALCIUM IONIZED, CALC AT PH 7.4: 1.05 MMOL/L (ref 1.11–1.3)
CALCIUM IONIZED: 0.92 MMOL/L (ref 1.11–1.3)
CALCIUM IONIZED: 0.99 MMOL/L (ref 1.11–1.3)
CALCIUM IONIZED: 1.09 MMOL/L (ref 1.11–1.3)
CALCIUM IONIZED: 1.15 MMOL/L (ref 1.11–1.3)
CALCIUM IONIZED: 1.2 MMOL/L (ref 1.11–1.3)

## 2023-02-17 LAB
BLOOD CULTURE, ROUTINE: NORMAL
CULTURE, BLOOD 2: NORMAL

## 2023-03-02 NOTE — PROGRESS NOTES
"EMERGENCY DEPARTMENT ATTENDING NOTE    Patient Name: Jolene Del Cid    Chief Complaint   Patient presents with   • Abdominal Pain   • Back Pain   • Nausea       PATIENT PRESENTATION:  Jolene Del Cid is a very pleasant 19 y.o. female with a history of prior urinary tract infections but no significant past medical history no prior abdominal surgeries who presents to the emergency department due to right-sided flank and abdominal pain with dysuria.    Patient states that she was having some dysuria a few weeks back she took some Azo over-the-counter states her symptoms went away but then have returned over the last few days.  She has not noticed any fevers but has felt slightly warm.  Has not checked her temperature.  Denies any chills.  Has had some nausea although denies any vomiting.  No diarrhea.  No history of any abdominal surgeries no history of ulcerative colitis or Crohn's in her family.  Her mother is present bedside does state that both she as well as her son have had kidney stones though this patient is never had a kidney stone.  Patient is currently on her menstrual period.       PHYSICAL EXAM:  VS: BP (!) 134/64 (BP Location: Right arm, Patient Position: Sitting)   Pulse 90   Temp 99.1 °F (37.3 °C) (Temporal)   Resp 18   Ht 165.1 cm (65\")   Wt 67.1 kg (148 lb)   LMP 03/01/2023 (Exact Date) Comment: Currently on period  SpO2 96%   BMI 24.63 kg/m²   GENERAL: Well-appearing young woman sitting up in stretcher no acute distress; well-nourished, well-developed, awake, alert, no acute distress, nontoxic appearing, comfortable  RESPIRATORY: Unlabored respiratory effort, clear to auscultation bilaterally, good air movement  CARDIOVASCULAR: No murmurs or gallops, peripheral pulses 2+ and equal in all extremities  GI: Soft, nontender, nondistended, bowel sounds present, no hepatosplenomegaly  MUSCULOSKELETAL/EXTREMITIES: Extremities without obvious deformity, no cyanosis or clubbing  SKIN: warm and dry with " PHARMACY NOTE:   ICU Rounds Attended (10-15 minutes in patient room):    Pt diagnosis: cardiopulmonary arrest    IV Fluids: D5 @ 100, bicarb drip    Renal:   Recent Labs     02/13/23  0410 02/13/23  0730 02/13/23  0941   CREATININE 2.3* 2.38* 2.4*    Estimated Creatinine Clearance: 25 mL/min (A) (based on SCr of 2.4 mg/dL (H)).         Antimicrobial Therapy:   Zosyn added on rounds    Recent Labs     02/12/23  0645   WBC 23.9*         Pressors:   norepinephrine @ 55 mcg/min   epinephrine @ 8mcg/min   vasopressin @ 0.04 units/min  Sedation:   Fentanyl   Propofol   Drips: amio drip    Insulin Therapy (goal: 140-180): none    Recent Labs     02/12/23  1930 02/12/23  2309 02/13/23  0730   GLUCOSE 141* 164* 151*       Steroid Therapy: added on rounds  Stress Ulcer Prophylaxis:   Pantoprazole added    DVT Prophylaxis/Anticoagulant Therapy: added on rounds  Recent Labs     02/12/23  0645        Recent Labs     02/12/23  0645 02/12/23  1131   INR 1.3 1.6         Bowel Regimen:   none    Follow up/Changes:   -follow up SSI and bowel meds as appropriate  -NTG ointment added per verbal for hands and feet  -solu-coref 50 q6h added per verbal  -zosyn added per verbal  -heparin TID added per verbal  -protonix added per verbal      [facility] [place] Pharm[facility] no obvious rashes  PSYCHIATRIC: alert, pleasant and cooperative. Appropriate mood and affect.      MEDICAL DECISION MAKING:    Jolene Del Cid is a 19 y.o. female presents emergency department due to right-sided flank and abdominal pain with some nausea.    Differential Diagnosis Considered: Urinary tract infection, pyelonephritis, obstructing ureteral stone, constipation, viral gastroenteritis    Labs Ordered:  Labs Reviewed   COMPREHENSIVE METABOLIC PANEL - Abnormal; Notable for the following components:       Result Value    Creatinine 0.53 (*)     CO2 21.0 (*)     Albumin 4.6 (*)     ALT (SGPT) 7 (*)     Anion Gap 16.0 (*)     All other components within normal limits   URINALYSIS W/ CULTURE IF INDICATED - Abnormal; Notable for the following components:    Blood, UA Moderate (2+) (*)     Leuk Esterase, UA Small (1+) (*)     All other components within normal limits    Narrative:     In absence of clinical symptoms, the presence of pyuria, bacteria, and/or nitrites on the urinalysis result does not correlate with infection.   CBC WITH AUTO DIFFERENTIAL - Abnormal; Notable for the following components:    Hemoglobin 11.8 (*)     Lymphocyte % 17.9 (*)     All other components within normal limits   URINALYSIS, MICROSCOPIC ONLY - Abnormal; Notable for the following components:    RBC, UA 6-12 (*)     WBC, UA 6-12 (*)     All other components within normal limits   LIPASE - Normal   HCG, SERUM, QUALITATIVE - Normal   URINE CULTURE   CBC AND DIFFERENTIAL    Narrative:     The following orders were created for panel order CBC & Differential.  Procedure                               Abnormality         Status                     ---------                               -----------         ------                     CBC Auto Differential[[id number]]        Abnormal            Final result                 Please view results for these tests on the individual orders.        Imaging Ordered:   CT Abdomen Pelvis Without Contrast    Final Result       No urolithiasis or hydronephrosis. No acute findings.   This report was finalized on 03/01/2023 21:07 by Dr. Ramesh Mcdaniel MD.          My lab interpretation: Normal white blood cell count.  Only slightly resumed 11.8.  Negative Parenta test.  Normal lipase.  Normal liver enzymes.  CMP otherwise unremarkable.  Urinalysis with only 6-12 white blood cells and red blood cells but no bacteria seen.    My imaging interpretation: CT abdomen pelvis without IV contrast with no acute findings.    Shared decision making: Discussed risk and benefits of CT scan with mother and patient the bedside following results of labs.  Her presentation is concerning for kidney stone she is having worsening pain although she is 19 she is just short of 20.  Discussed that there is a slight increase chance of cancer in the setting of CT scans during pediatric.  Mother stated that she would definitely want to pursue CT scan and not just a KUB for definitive result.  Given this CT scan was ordered.    ED Course and Re-evaluation: 18yo F presenting to the emerged part with right-sided flank pain.  Initial vital signs with some tachycardia but patient afebrile.  Given IV Zofran IV morphine and IV fluids.  Her abdominal exam is benign soft and nontender I suspicion for possible kidney stones versus pyelonephritis based on initial presentation.  Labs are fairly unremarkable urinalysis is only 6-12 white blood cells red blood cells could be due to kidney stones also she is on her period.  Patient reported some increasing and worsening pain so discussed risk and benefits of further imaging with parent at the bedside as well as the patient and they stated they would much prefer to go ahead with CT scan.  CT scan was ordered and shows no acute findings.  Unclear exact etiology of patient's complete presentation.  Given her history would consider entrapped ureteral stone is as well as with some blood in her urine but no evidence of  any obstructing stone.  I feel urinalysis has elevated white blood cells and red blood cells likely in setting of her known ongoing menstrual period.  It does not appear to be infected.  Also normal white blood cell count not consistent with pyonephritis or urinary tract infection.  Normal lipase normal liver enzymes only slight decrease hemoglobin in the setting of patient on her period.  Tachycardia improved with pain medicines and fluids.  Patient with no abdominal tenderness normal labs no low suspicion for appendicitis diverticulitis and no evidence on CT imaging.  Also no evidence of any gallstones or cholecystitis.  Patient with no lower abdominal pain that would be concerning for ovarian pathology.    Patient remained well-appearing on reevaluation.  Patient was discharged home with prescription for Bentyl and given she does not have a primary care provider provided with the number for OB/GYN as well as left primary care providers to get schedule close follow-up.  Gave return precautions to the emergency department to her and her mother at the bedside.      ED Diagnosis:  Acute abdominal pain in right flank; Nausea; Tachycardia; Dysuria; History of urinary tract infection    Disposition: to home  Follow up plan: PCP follow up within 2 days, return to ED immediately if symptoms worsen          Signed:  Cj Munguia MD  Emergency Medicine Physician    Please note that portions of this note were completed with a voice recognition program.      Cj Munguia MD  03/01/23 213       Cj Munguia MD  03/01/23 2138

## 2023-07-28 DIAGNOSIS — F17.200 NICOTINE DEPENDENCE, UNSPECIFIED, UNCOMPLICATED: ICD-10-CM

## 2023-07-28 RX ORDER — IBUPROFEN 200 MG
1 TABLET ORAL DAILY
Qty: 28 PATCH | Refills: 2 | OUTPATIENT
Start: 2023-07-28

## 2023-09-05 DIAGNOSIS — M79.7 FIBROMYALGIA: ICD-10-CM

## 2023-09-05 RX ORDER — CELECOXIB 200 MG/1
200 CAPSULE ORAL DAILY
Qty: 90 CAPSULE | Refills: 3 | OUTPATIENT
Start: 2023-09-05

## 2023-09-05 RX ORDER — DULOXETIN HYDROCHLORIDE 60 MG/1
60 CAPSULE, DELAYED RELEASE ORAL DAILY
Qty: 30 CAPSULE | Refills: 3 | OUTPATIENT
Start: 2023-09-05

## 2024-01-03 DIAGNOSIS — F31.9 BIPOLAR DISORDER, UNSPECIFIED (MULTI): ICD-10-CM

## 2024-01-03 RX ORDER — LAMOTRIGINE 200 MG/1
1 TABLET ORAL 2 TIMES DAILY
Qty: 60 TABLET | Refills: 5 | OUTPATIENT
Start: 2024-01-03